# Patient Record
Sex: FEMALE | Race: WHITE | NOT HISPANIC OR LATINO | Employment: UNEMPLOYED | URBAN - METROPOLITAN AREA
[De-identification: names, ages, dates, MRNs, and addresses within clinical notes are randomized per-mention and may not be internally consistent; named-entity substitution may affect disease eponyms.]

---

## 2017-02-03 ENCOUNTER — HOSPITAL ENCOUNTER (OUTPATIENT)
Dept: CARDIOLOGY | Facility: HOSPITAL | Age: 62
Discharge: HOME | End: 2017-02-03
Attending: ORTHOPAEDIC SURGERY | Admitting: ORTHOPAEDIC SURGERY

## 2017-02-03 ENCOUNTER — TRANSCRIBE ORDERS (OUTPATIENT)
Dept: ADMINISTRATIVE | Facility: HOSPITAL | Age: 62
End: 2017-02-03

## 2017-02-03 ENCOUNTER — LAB (OUTPATIENT)
Dept: LAB | Facility: HOSPITAL | Age: 62
End: 2017-02-03
Attending: ORTHOPAEDIC SURGERY

## 2017-02-03 DIAGNOSIS — M20.20 HALLUX RIGIDUS, UNSPECIFIED LATERALITY: ICD-10-CM

## 2017-02-03 DIAGNOSIS — M20.20 HALLUX RIGIDUS, UNSPECIFIED LATERALITY: Primary | ICD-10-CM

## 2017-02-03 LAB
ALBUMIN SERPL-MCNC: 4.6 G/DL (ref 3.5–5.2)
ALBUMIN/GLOB SERPL: 1.7 G/DL
ALP SERPL-CCNC: 47 U/L (ref 39–117)
ALT SERPL W P-5'-P-CCNC: 26 U/L (ref 1–33)
ANION GAP SERPL CALCULATED.3IONS-SCNC: 12.7 MMOL/L
AST SERPL-CCNC: 25 U/L (ref 1–32)
BASOPHILS # BLD AUTO: 0.03 10*3/MM3 (ref 0–0.2)
BASOPHILS NFR BLD AUTO: 0.4 % (ref 0–1.5)
BILIRUB SERPL-MCNC: 0.6 MG/DL (ref 0.1–1.2)
BUN BLD-MCNC: 13 MG/DL (ref 6–20)
BUN/CREAT SERPL: 16 (ref 7–25)
CALCIUM SPEC-SCNC: 9.8 MG/DL (ref 8.6–10.5)
CHLORIDE SERPL-SCNC: 100 MMOL/L (ref 98–107)
CO2 SERPL-SCNC: 26.3 MMOL/L (ref 22–29)
CREAT BLD-MCNC: 0.81 MG/DL (ref 0.57–1)
DEPRECATED RDW RBC AUTO: 45.6 FL (ref 37–54)
EOSINOPHIL # BLD AUTO: 0.34 10*3/MM3 (ref 0–0.7)
EOSINOPHIL NFR BLD AUTO: 5 % (ref 0.3–6.2)
ERYTHROCYTE [DISTWIDTH] IN BLOOD BY AUTOMATED COUNT: 13.8 % (ref 11.7–13)
GFR SERPL CREATININE-BSD FRML MDRD: 72 ML/MIN/1.73
GLOBULIN UR ELPH-MCNC: 2.7 GM/DL
GLUCOSE BLD-MCNC: 81 MG/DL (ref 65–99)
HCT VFR BLD AUTO: 44.4 % (ref 35.6–45.5)
HGB BLD-MCNC: 14.8 G/DL (ref 11.9–15.5)
IMM GRANULOCYTES # BLD: 0.02 10*3/MM3 (ref 0–0.03)
IMM GRANULOCYTES NFR BLD: 0.3 % (ref 0–0.5)
LYMPHOCYTES # BLD AUTO: 2.85 10*3/MM3 (ref 0.9–4.8)
LYMPHOCYTES NFR BLD AUTO: 41.9 % (ref 19.6–45.3)
MCH RBC QN AUTO: 30.3 PG (ref 26.9–32)
MCHC RBC AUTO-ENTMCNC: 33.3 G/DL (ref 32.4–36.3)
MCV RBC AUTO: 90.8 FL (ref 80.5–98.2)
MONOCYTES # BLD AUTO: 0.43 10*3/MM3 (ref 0.2–1.2)
MONOCYTES NFR BLD AUTO: 6.3 % (ref 5–12)
NEUTROPHILS # BLD AUTO: 3.13 10*3/MM3 (ref 1.9–8.1)
NEUTROPHILS NFR BLD AUTO: 46.1 % (ref 42.7–76)
PLATELET # BLD AUTO: 256 10*3/MM3 (ref 140–500)
PMV BLD AUTO: 10.9 FL (ref 6–12)
POTASSIUM BLD-SCNC: 3.8 MMOL/L (ref 3.5–5.2)
PROT SERPL-MCNC: 7.3 G/DL (ref 6–8.5)
RBC # BLD AUTO: 4.89 10*6/MM3 (ref 3.9–5.2)
SODIUM BLD-SCNC: 139 MMOL/L (ref 136–145)
WBC NRBC COR # BLD: 6.8 10*3/MM3 (ref 4.5–10.7)

## 2017-02-03 PROCEDURE — 36415 COLL VENOUS BLD VENIPUNCTURE: CPT

## 2017-02-03 PROCEDURE — 85025 COMPLETE CBC W/AUTO DIFF WBC: CPT

## 2017-02-03 PROCEDURE — 80053 COMPREHEN METABOLIC PANEL: CPT

## 2017-02-03 PROCEDURE — 93005 ELECTROCARDIOGRAM TRACING: CPT | Performed by: ORTHOPAEDIC SURGERY

## 2017-02-03 PROCEDURE — 93010 ELECTROCARDIOGRAM REPORT: CPT | Performed by: INTERNAL MEDICINE

## 2017-07-28 ENCOUNTER — APPOINTMENT (OUTPATIENT)
Dept: WOMENS IMAGING | Facility: HOSPITAL | Age: 62
End: 2017-07-28

## 2017-07-28 PROCEDURE — 77067 SCR MAMMO BI INCL CAD: CPT | Performed by: RADIOLOGY

## 2017-07-28 PROCEDURE — G0202 SCR MAMMO BI INCL CAD: HCPCS | Performed by: RADIOLOGY

## 2017-07-28 PROCEDURE — MDREVIEWSP: Performed by: RADIOLOGY

## 2017-07-28 PROCEDURE — 77063 BREAST TOMOSYNTHESIS BI: CPT | Performed by: RADIOLOGY

## 2018-05-22 ENCOUNTER — LAB (OUTPATIENT)
Dept: LAB | Facility: HOSPITAL | Age: 63
End: 2018-05-22
Attending: OBSTETRICS & GYNECOLOGY

## 2018-05-22 ENCOUNTER — TRANSCRIBE ORDERS (OUTPATIENT)
Dept: ADMINISTRATIVE | Facility: HOSPITAL | Age: 63
End: 2018-05-22

## 2018-05-22 DIAGNOSIS — R10.31 ABDOMINAL PAIN, RIGHT LOWER QUADRANT: ICD-10-CM

## 2018-05-22 DIAGNOSIS — T81.44XA POSTOPERATIVE SEPTICEMIA (HCC): Primary | ICD-10-CM

## 2018-05-22 DIAGNOSIS — T81.44XA POSTOPERATIVE SEPTICEMIA (HCC): ICD-10-CM

## 2018-05-22 LAB
BASOPHILS # BLD AUTO: 0.04 10*3/MM3 (ref 0–0.2)
BASOPHILS NFR BLD AUTO: 0.5 % (ref 0–1.5)
DEPRECATED RDW RBC AUTO: 45.7 FL (ref 37–54)
EOSINOPHIL # BLD AUTO: 0.26 10*3/MM3 (ref 0–0.7)
EOSINOPHIL NFR BLD AUTO: 3.4 % (ref 0.3–6.2)
ERYTHROCYTE [DISTWIDTH] IN BLOOD BY AUTOMATED COUNT: 13.6 % (ref 11.7–13)
HCT VFR BLD AUTO: 43.9 % (ref 35.6–45.5)
HGB BLD-MCNC: 14.4 G/DL (ref 11.9–15.5)
IMM GRANULOCYTES # BLD: 0 10*3/MM3 (ref 0–0.03)
IMM GRANULOCYTES NFR BLD: 0 % (ref 0–0.5)
LYMPHOCYTES # BLD AUTO: 2.52 10*3/MM3 (ref 0.9–4.8)
LYMPHOCYTES NFR BLD AUTO: 33.3 % (ref 19.6–45.3)
MCH RBC QN AUTO: 30.3 PG (ref 26.9–32)
MCHC RBC AUTO-ENTMCNC: 32.8 G/DL (ref 32.4–36.3)
MCV RBC AUTO: 92.4 FL (ref 80.5–98.2)
MONOCYTES # BLD AUTO: 0.56 10*3/MM3 (ref 0.2–1.2)
MONOCYTES NFR BLD AUTO: 7.4 % (ref 5–12)
NEUTROPHILS # BLD AUTO: 4.19 10*3/MM3 (ref 1.9–8.1)
NEUTROPHILS NFR BLD AUTO: 55.4 % (ref 42.7–76)
PLATELET # BLD AUTO: 232 10*3/MM3 (ref 140–500)
PMV BLD AUTO: 10.3 FL (ref 6–12)
RBC # BLD AUTO: 4.75 10*6/MM3 (ref 3.9–5.2)
WBC NRBC COR # BLD: 7.57 10*3/MM3 (ref 4.5–10.7)

## 2018-05-22 PROCEDURE — 36415 COLL VENOUS BLD VENIPUNCTURE: CPT

## 2018-05-22 PROCEDURE — 85025 COMPLETE CBC W/AUTO DIFF WBC: CPT

## 2018-08-21 ENCOUNTER — APPOINTMENT (OUTPATIENT)
Dept: WOMENS IMAGING | Facility: HOSPITAL | Age: 63
End: 2018-08-21

## 2018-08-21 PROCEDURE — 77063 BREAST TOMOSYNTHESIS BI: CPT | Performed by: RADIOLOGY

## 2018-08-21 PROCEDURE — MDREVIEWSP: Performed by: RADIOLOGY

## 2018-08-21 PROCEDURE — 77067 SCR MAMMO BI INCL CAD: CPT | Performed by: RADIOLOGY

## 2019-09-23 ENCOUNTER — APPOINTMENT (OUTPATIENT)
Dept: WOMENS IMAGING | Facility: HOSPITAL | Age: 64
End: 2019-09-23

## 2019-09-23 PROCEDURE — 77063 BREAST TOMOSYNTHESIS BI: CPT | Performed by: RADIOLOGY

## 2019-09-23 PROCEDURE — 77067 SCR MAMMO BI INCL CAD: CPT | Performed by: RADIOLOGY

## 2019-09-23 PROCEDURE — MDREVIEWSP: Performed by: RADIOLOGY

## 2019-11-20 ENCOUNTER — OFFICE (OUTPATIENT)
Dept: URBAN - METROPOLITAN AREA CLINIC 66 | Facility: CLINIC | Age: 64
End: 2019-11-20
Payer: COMMERCIAL

## 2019-11-20 VITALS
HEART RATE: 70 BPM | DIASTOLIC BLOOD PRESSURE: 78 MMHG | HEIGHT: 64 IN | WEIGHT: 120 LBS | SYSTOLIC BLOOD PRESSURE: 142 MMHG

## 2019-11-20 DIAGNOSIS — K58.9 IRRITABLE BOWEL SYNDROME WITHOUT DIARRHEA: ICD-10-CM

## 2019-11-20 DIAGNOSIS — R68.81 EARLY SATIETY: ICD-10-CM

## 2019-11-20 DIAGNOSIS — R15.0 INCOMPLETE DEFECATION: ICD-10-CM

## 2019-11-20 DIAGNOSIS — K30 FUNCTIONAL DYSPEPSIA: ICD-10-CM

## 2019-11-20 PROCEDURE — 99242 OFF/OP CONSLTJ NEW/EST SF 20: CPT | Performed by: INTERNAL MEDICINE

## 2019-11-20 PROCEDURE — 99202 OFFICE O/P NEW SF 15 MIN: CPT | Performed by: INTERNAL MEDICINE

## 2019-11-20 RX ORDER — DICYCLOMINE HYDROCHLORIDE 20 MG/1
80 TABLET ORAL
Qty: 120 | Refills: 12 | Status: ACTIVE
Start: 2019-11-20

## 2019-11-20 RX ORDER — FAMOTIDINE 40 MG/1
80 TABLET, FILM COATED ORAL
Qty: 60 | Refills: 4 | Status: ACTIVE
Start: 2019-11-20

## 2019-12-02 ENCOUNTER — AMBULATORY SURGICAL CENTER (OUTPATIENT)
Dept: URBAN - METROPOLITAN AREA SURGERY 20 | Facility: SURGERY | Age: 64
End: 2019-12-02
Payer: COMMERCIAL

## 2019-12-02 ENCOUNTER — OFFICE (OUTPATIENT)
Dept: URBAN - METROPOLITAN AREA PATHOLOGY 4 | Facility: PATHOLOGY | Age: 64
End: 2019-12-02

## 2019-12-02 VITALS — HEIGHT: 64 IN

## 2019-12-02 DIAGNOSIS — R68.81 EARLY SATIETY: ICD-10-CM

## 2019-12-02 DIAGNOSIS — K29.50 UNSPECIFIED CHRONIC GASTRITIS WITHOUT BLEEDING: ICD-10-CM

## 2019-12-02 DIAGNOSIS — K30 FUNCTIONAL DYSPEPSIA: ICD-10-CM

## 2019-12-02 DIAGNOSIS — Z12.11 ENCOUNTER FOR SCREENING FOR MALIGNANT NEOPLASM OF COLON: ICD-10-CM

## 2019-12-02 PROBLEM — K29.70 GASTRITIS, UNSPECIFIED, WITHOUT BLEEDING: Status: ACTIVE | Noted: 2019-12-02

## 2019-12-02 LAB
GI HISTOLOGY: A. SELECT: (no result)
GI HISTOLOGY: B. SELECT: (no result)
GI HISTOLOGY: PDF REPORT: (no result)

## 2019-12-02 PROCEDURE — 88305 TISSUE EXAM BY PATHOLOGIST: CPT | Performed by: INTERNAL MEDICINE

## 2019-12-02 PROCEDURE — 43239 EGD BIOPSY SINGLE/MULTIPLE: CPT | Performed by: INTERNAL MEDICINE

## 2019-12-02 PROCEDURE — 45378 DIAGNOSTIC COLONOSCOPY: CPT | Mod: 33 | Performed by: INTERNAL MEDICINE

## 2021-03-16 ENCOUNTER — BULK ORDERING (OUTPATIENT)
Dept: CASE MANAGEMENT | Facility: OTHER | Age: 66
End: 2021-03-16

## 2021-03-16 DIAGNOSIS — Z23 IMMUNIZATION DUE: ICD-10-CM

## 2021-06-16 ENCOUNTER — IMMUNIZATION (OUTPATIENT)
Dept: VACCINE CLINIC | Facility: HOSPITAL | Age: 66
End: 2021-06-16

## 2021-06-16 PROCEDURE — 0001A: CPT | Performed by: INTERNAL MEDICINE

## 2021-06-16 PROCEDURE — 91300 HC SARSCOV02 VAC 30MCG/0.3ML IM: CPT | Performed by: INTERNAL MEDICINE

## 2021-06-17 ENCOUNTER — APPOINTMENT (OUTPATIENT)
Dept: VACCINE CLINIC | Facility: HOSPITAL | Age: 66
End: 2021-06-17

## 2021-07-09 ENCOUNTER — IMMUNIZATION (OUTPATIENT)
Dept: VACCINE CLINIC | Facility: HOSPITAL | Age: 66
End: 2021-07-09

## 2021-07-09 ENCOUNTER — APPOINTMENT (OUTPATIENT)
Dept: VACCINE CLINIC | Facility: HOSPITAL | Age: 66
End: 2021-07-09

## 2021-07-09 PROCEDURE — 0002A: CPT | Performed by: INTERNAL MEDICINE

## 2021-07-09 PROCEDURE — 91300 HC SARSCOV02 VAC 30MCG/0.3ML IM: CPT | Performed by: INTERNAL MEDICINE

## 2021-07-15 ENCOUNTER — APPOINTMENT (OUTPATIENT)
Dept: VACCINE CLINIC | Facility: HOSPITAL | Age: 66
End: 2021-07-15

## 2021-12-29 ENCOUNTER — APPOINTMENT (OUTPATIENT)
Dept: VACCINE CLINIC | Facility: HOSPITAL | Age: 66
End: 2021-12-29

## 2022-06-01 ENCOUNTER — TRANSCRIBE ORDERS (OUTPATIENT)
Dept: PHYSICAL THERAPY | Facility: CLINIC | Age: 67
End: 2022-06-01

## 2022-06-01 DIAGNOSIS — Z96.612 STATUS POST TOTAL SHOULDER ARTHROPLASTY, LEFT: Primary | ICD-10-CM

## 2022-06-02 ENCOUNTER — TREATMENT (OUTPATIENT)
Dept: PHYSICAL THERAPY | Facility: CLINIC | Age: 67
End: 2022-06-02

## 2022-06-02 DIAGNOSIS — R68.89 IMPAIRED FUNCTION OF UPPER EXTREMITY: ICD-10-CM

## 2022-06-02 DIAGNOSIS — M25.512 CHRONIC LEFT SHOULDER PAIN: ICD-10-CM

## 2022-06-02 DIAGNOSIS — G89.29 CHRONIC LEFT SHOULDER PAIN: ICD-10-CM

## 2022-06-02 DIAGNOSIS — M19.019 SHOULDER ARTHRITIS: Primary | ICD-10-CM

## 2022-06-02 PROCEDURE — 97140 MANUAL THERAPY 1/> REGIONS: CPT | Performed by: PHYSICAL THERAPIST

## 2022-06-02 PROCEDURE — 97162 PT EVAL MOD COMPLEX 30 MIN: CPT | Performed by: PHYSICAL THERAPIST

## 2022-06-02 PROCEDURE — 97530 THERAPEUTIC ACTIVITIES: CPT | Performed by: PHYSICAL THERAPIST

## 2022-06-02 PROCEDURE — 97110 THERAPEUTIC EXERCISES: CPT | Performed by: PHYSICAL THERAPIST
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
## 2022-06-07 ENCOUNTER — TREATMENT (OUTPATIENT)
Dept: PHYSICAL THERAPY | Facility: CLINIC | Age: 67
End: 2022-06-07

## 2022-06-07 DIAGNOSIS — M19.019 SHOULDER ARTHRITIS: Primary | ICD-10-CM

## 2022-06-07 DIAGNOSIS — G89.29 CHRONIC LEFT SHOULDER PAIN: ICD-10-CM

## 2022-06-07 DIAGNOSIS — M25.512 CHRONIC LEFT SHOULDER PAIN: ICD-10-CM

## 2022-06-07 DIAGNOSIS — R68.89 IMPAIRED FUNCTION OF UPPER EXTREMITY: ICD-10-CM

## 2022-06-07 PROCEDURE — 97140 MANUAL THERAPY 1/> REGIONS: CPT | Performed by: PHYSICAL THERAPIST

## 2022-06-07 PROCEDURE — 97110 THERAPEUTIC EXERCISES: CPT | Performed by: PHYSICAL THERAPIST

## 2022-06-07 NOTE — PROGRESS NOTES
Physical Therapy Daily Progress Note    Patient: Tati Coronado   : 1955  Referring practitioner: BASIL Anderson  Today's Date: 2022  Patient seen for 2 sessions    Visit Diagnoses:    ICD-10-CM ICD-9-CM   1. Shoulder arthritis  M19.019 716.91   2. Impaired function of upper extremity  R68.89 V49.5   3. Chronic left shoulder pain  M25.512 719.41    G89.29 338.29       Subjective   Tati Coronado reports: that she was compliant with her HEP.  Did have some pain with scapular retraction/ER      Objective   Near full flexion and abduction but moderately limited IR due to pain and tightness    See Exercise, Manual, and Modality Logs for complete treatment.     Patient Education: Required cueing for proper HEP technique    Assessment/Plan  With cueing exhibited proper exercise technique.  Considerable crepitus with abduction.  Able to tolerate new exercises without increased symptoms     Progress strengthening /stabilization /functional activity           Timed:  Manual Therapy:    10     mins  93491;  Therapeutic Exercise:    30/40     mins  09779;     Neuromuscular Alana:    0    mins  20492;    Therapeutic Activity:     0     mins  63780;     Ultrasound:      0     mins  46970;    Iontophoresis              __0_   mins  Dry Needling               _____   mins        Untimed:  Electrical Stimulation:     0    mins  06901 ( );  Mechanical Traction:             mins  20614;   Paraffin                       _____  mins     Timed Treatment:   40   mins   Total Treatment:     50   mins    Bhavna Davis PT  [place] License # [id number]  Physical Therapist    Electronically signed by Bhavna Davis PT, 22, 3:09 PM EDT

## 2022-06-08 ENCOUNTER — HOSPITAL ENCOUNTER (OUTPATIENT)
Dept: CARDIOLOGY | Facility: HOSPITAL | Age: 67
Discharge: HOME | End: 2022-06-08

## 2022-06-08 ENCOUNTER — HOSPITAL ENCOUNTER (OUTPATIENT)
Dept: GENERAL RADIOLOGY | Facility: HOSPITAL | Age: 67
Discharge: HOME | End: 2022-06-08

## 2022-06-08 ENCOUNTER — LAB (OUTPATIENT)
Dept: LAB | Facility: HOSPITAL | Age: 67
End: 2022-06-08

## 2022-06-08 ENCOUNTER — TRANSCRIBE ORDERS (OUTPATIENT)
Dept: ADMINISTRATIVE | Facility: HOSPITAL | Age: 67
End: 2022-06-08

## 2022-06-08 DIAGNOSIS — M25.512 LEFT SHOULDER PAIN, UNSPECIFIED CHRONICITY: ICD-10-CM

## 2022-06-08 DIAGNOSIS — Z01.818 PRE-OP TESTING: ICD-10-CM

## 2022-06-08 DIAGNOSIS — Z01.811 PRE-OP CHEST EXAM: ICD-10-CM

## 2022-06-08 DIAGNOSIS — Z01.818 PRE-OP TESTING: Primary | ICD-10-CM

## 2022-06-08 LAB
ALBUMIN SERPL-MCNC: 4.6 G/DL (ref 3.5–5.2)
ALBUMIN/GLOB SERPL: 2.4 G/DL
ALP SERPL-CCNC: 50 U/L (ref 39–117)
ALT SERPL W P-5'-P-CCNC: 16 U/L (ref 1–33)
ANION GAP SERPL CALCULATED.3IONS-SCNC: 10 MMOL/L (ref 5–15)
APTT PPP: 24.9 SECONDS (ref 22.7–35.4)
AST SERPL-CCNC: 20 U/L (ref 1–32)
BASOPHILS # BLD AUTO: 0.06 10*3/MM3 (ref 0–0.2)
BASOPHILS NFR BLD AUTO: 0.9 % (ref 0–1.5)
BILIRUB SERPL-MCNC: 0.5 MG/DL (ref 0–1.2)
BILIRUB UR QL STRIP: NEGATIVE
BUN SERPL-MCNC: 11 MG/DL (ref 8–23)
BUN/CREAT SERPL: 15.7 (ref 7–25)
CALCIUM SPEC-SCNC: 9.2 MG/DL (ref 8.6–10.5)
CHLORIDE SERPL-SCNC: 105 MMOL/L (ref 98–107)
CLARITY UR: CLEAR
CO2 SERPL-SCNC: 26 MMOL/L (ref 22–29)
COLOR UR: YELLOW
CREAT SERPL-MCNC: 0.7 MG/DL (ref 0.57–1)
DEPRECATED RDW RBC AUTO: 40.5 FL (ref 37–54)
EGFRCR SERPLBLD CKD-EPI 2021: 96.7 ML/MIN/1.73
EOSINOPHIL # BLD AUTO: 0.3 10*3/MM3 (ref 0–0.4)
EOSINOPHIL NFR BLD AUTO: 4.6 % (ref 0.3–6.2)
ERYTHROCYTE [DISTWIDTH] IN BLOOD BY AUTOMATED COUNT: 12.5 % (ref 12.3–15.4)
GLOBULIN UR ELPH-MCNC: 1.9 GM/DL
GLUCOSE SERPL-MCNC: 81 MG/DL (ref 65–99)
GLUCOSE UR STRIP-MCNC: NEGATIVE MG/DL
HCT VFR BLD AUTO: 42.7 % (ref 34–46.6)
HGB BLD-MCNC: 14 G/DL (ref 12–15.9)
HGB UR QL STRIP.AUTO: NEGATIVE
IMM GRANULOCYTES # BLD AUTO: 0.03 10*3/MM3 (ref 0–0.05)
IMM GRANULOCYTES NFR BLD AUTO: 0.5 % (ref 0–0.5)
INR PPP: 1.01 (ref 0.9–1.1)
KETONES UR QL STRIP: NEGATIVE
LEUKOCYTE ESTERASE UR QL STRIP.AUTO: NEGATIVE
LYMPHOCYTES # BLD AUTO: 2.17 10*3/MM3 (ref 0.7–3.1)
LYMPHOCYTES NFR BLD AUTO: 33.1 % (ref 19.6–45.3)
MCH RBC QN AUTO: 29.5 PG (ref 26.6–33)
MCHC RBC AUTO-ENTMCNC: 32.8 G/DL (ref 31.5–35.7)
MCV RBC AUTO: 89.9 FL (ref 79–97)
MONOCYTES # BLD AUTO: 0.48 10*3/MM3 (ref 0.1–0.9)
MONOCYTES NFR BLD AUTO: 7.3 % (ref 5–12)
NEUTROPHILS NFR BLD AUTO: 3.52 10*3/MM3 (ref 1.7–7)
NEUTROPHILS NFR BLD AUTO: 53.6 % (ref 42.7–76)
NITRITE UR QL STRIP: NEGATIVE
NRBC BLD AUTO-RTO: 0 /100 WBC (ref 0–0.2)
PH UR STRIP.AUTO: 6 [PH] (ref 5–8)
PLATELET # BLD AUTO: 274 10*3/MM3 (ref 140–450)
PMV BLD AUTO: 9.9 FL (ref 6–12)
POTASSIUM SERPL-SCNC: 3.9 MMOL/L (ref 3.5–5.2)
PROT SERPL-MCNC: 6.5 G/DL (ref 6–8.5)
PROT UR QL STRIP: NEGATIVE
PROTHROMBIN TIME: 13.2 SECONDS (ref 11.7–14.2)
QT INTERVAL: 363 MS
RBC # BLD AUTO: 4.75 10*6/MM3 (ref 3.77–5.28)
SODIUM SERPL-SCNC: 141 MMOL/L (ref 136–145)
SP GR UR STRIP: <=1.005 (ref 1–1.03)
UROBILINOGEN UR QL STRIP: NORMAL
WBC NRBC COR # BLD: 6.56 10*3/MM3 (ref 3.4–10.8)

## 2022-06-08 PROCEDURE — 81003 URINALYSIS AUTO W/O SCOPE: CPT

## 2022-06-08 PROCEDURE — 36415 COLL VENOUS BLD VENIPUNCTURE: CPT

## 2022-06-08 PROCEDURE — 85610 PROTHROMBIN TIME: CPT

## 2022-06-08 PROCEDURE — 85730 THROMBOPLASTIN TIME PARTIAL: CPT

## 2022-06-08 PROCEDURE — 71046 X-RAY EXAM CHEST 2 VIEWS: CPT

## 2022-06-08 PROCEDURE — 93010 ELECTROCARDIOGRAM REPORT: CPT | Performed by: INTERNAL MEDICINE

## 2022-06-08 PROCEDURE — 85025 COMPLETE CBC W/AUTO DIFF WBC: CPT

## 2022-06-08 PROCEDURE — 80053 COMPREHEN METABOLIC PANEL: CPT

## 2022-06-08 PROCEDURE — 93005 ELECTROCARDIOGRAM TRACING: CPT | Performed by: ORTHOPAEDIC SURGERY

## 2022-06-09 ENCOUNTER — TREATMENT (OUTPATIENT)
Dept: PHYSICAL THERAPY | Facility: CLINIC | Age: 67
End: 2022-06-09

## 2022-06-09 DIAGNOSIS — R68.89 IMPAIRED FUNCTION OF UPPER EXTREMITY: ICD-10-CM

## 2022-06-09 DIAGNOSIS — G89.29 CHRONIC LEFT SHOULDER PAIN: ICD-10-CM

## 2022-06-09 DIAGNOSIS — M19.019 SHOULDER ARTHRITIS: Primary | ICD-10-CM

## 2022-06-09 DIAGNOSIS — M25.512 CHRONIC LEFT SHOULDER PAIN: ICD-10-CM

## 2022-06-09 PROCEDURE — 97530 THERAPEUTIC ACTIVITIES: CPT | Performed by: PHYSICAL THERAPIST

## 2022-06-09 PROCEDURE — 97140 MANUAL THERAPY 1/> REGIONS: CPT | Performed by: PHYSICAL THERAPIST

## 2022-06-09 PROCEDURE — 97110 THERAPEUTIC EXERCISES: CPT | Performed by: PHYSICAL THERAPIST

## 2022-06-09 NOTE — PROGRESS NOTES
Physical Therapy Daily Progress Note    Patient: Tati Hanley   : 1955  Referring practitioner: BASIL Anderson  Today's Date: 2022  Patient seen for 3 sessions    Visit Diagnoses:    ICD-10-CM ICD-9-CM   1. Shoulder arthritis  M19.019 716.91   2. Impaired function of upper extremity  R68.89 V49.5   3. Chronic left shoulder pain  M25.512 719.41    G89.29 338.29       Subjective   Tati Hanley reports: that she has been compliant with her HEP.  Had quite a bit of pain last night preventing her sleep.  Unable to take meds last night due to upcoming surgery.      Objective   Palpable crepitus with shoulder flexion and abduction    See Exercise, Manual, and Modality Logs for complete treatment.     Patient Education: alternate some of the scapular exercises to do every other day. Instructed patient to call MD office and request ppt with PA/NP that works with Dr Hair so that she can ask the many questions that she has about her post op course    Assessment/Plan  Patient with considerable crepitus in shoulder preventing some exercises and chronic cervical pain which prevents some scapular exercises.  With modification of exercise routine shoulder minimize aggravation of chronic symptoms.  Patient with many question and was advised to speak to professional in MD office with hopes of answering her questions.   Required few cues for proper exercise technique today    Progress strengthening /stabilization /functional activity           Timed:  Manual Therapy:    8     mins  76722;  Therapeutic Exercise:    25/32     mins  90475;     Neuromuscular Alana:    0    mins  41429;    Therapeutic Activity:     10     mins  02441;   Discussion of post op course, call MD  Ultrasound:      0     mins  89975;    Iontophoresis              __0_   mins  Dry Needling               _____   mins        Untimed:  Electrical Stimulation:     0    mins  49096 ( );  Mechanical Traction:             mins   24878;   Paraffin                       _____  mins     Timed Treatment:   43   mins   Total Treatment:     60   mins    Bhavna Davis PT  [place] License # [id number]  Physical Therapist    Electronically signed by Bhavna Davis PT, 06/09/22, 12:10 PM EDT

## 2022-06-13 ENCOUNTER — TREATMENT (OUTPATIENT)
Dept: PHYSICAL THERAPY | Facility: CLINIC | Age: 67
End: 2022-06-13

## 2022-06-13 DIAGNOSIS — R68.89 IMPAIRED FUNCTION OF UPPER EXTREMITY: ICD-10-CM

## 2022-06-13 DIAGNOSIS — M25.512 CHRONIC LEFT SHOULDER PAIN: ICD-10-CM

## 2022-06-13 DIAGNOSIS — M19.019 SHOULDER ARTHRITIS: Primary | ICD-10-CM

## 2022-06-13 DIAGNOSIS — G89.29 CHRONIC LEFT SHOULDER PAIN: ICD-10-CM

## 2022-06-13 PROCEDURE — 97530 THERAPEUTIC ACTIVITIES: CPT | Performed by: PHYSICAL THERAPIST

## 2022-06-13 PROCEDURE — 97110 THERAPEUTIC EXERCISES: CPT | Performed by: PHYSICAL THERAPIST

## 2022-06-13 NOTE — PROGRESS NOTES
Physical Therapy Daily Progress Note    Patient: Tati Hanley   : 1955  Referring practitioner: BASIL Anderson  Today's Date: 2022  Patient seen for 4 sessions    Visit Diagnoses:    ICD-10-CM ICD-9-CM   1. Shoulder arthritis  M19.019 716.91   2. Impaired function of upper extremity  R68.89 V49.5   3. Chronic left shoulder pain  M25.512 719.41    G89.29 338.29       Subjective   Tati Hanley reports: that she has been compliant with her HEP and is using quite a bit of ice on the shoulder.  Was able to speak with Dr Hair's PA this morning.  Will be getting a hard copy of her post op protocol in the mail soon.      Objective   Near full shoulder motion except for IR which is moderately limited    Abbreviated exercises today due to AC being out in the clinic and it is too warm for significant exertion    See Exercise, Manual, and Modality Logs for complete treatment.     Patient Education: keep doing HEP until her surgery. Required cueing for proper exercise technique. Discussed post op progression    Assessment/Plan  After instruction her exercise technique was good.   Has functional motion at this time and seems to understand her exercise program. To have her TSA next week.  All goals met    DC to HEP - to have surgery next week           Timed:  Manual Therapy:    0     mins  20090;  Therapeutic Exercise:    25/35     mins  95826;     Neuromuscular Alana:    0    mins  95812;    Therapeutic Activity:     25     mins  35981;     Ultrasound:      0     mins  18053;    Iontophoresis              __0_   mins  Dry Needling               _____   mins        Untimed:  Electrical Stimulation:     0    mins  65083 ( );  Mechanical Traction:             mins  93323;   Paraffin                       _____  mins     Timed Treatment:   50   mins   Total Treatment:     65   mins    Bhavna Davis PT  [place] License # [id number]  Physical Therapist    Electronically signed by Bhavna Davis PT,  06/13/22, 2:31 PM EDT

## 2022-07-18 ENCOUNTER — TREATMENT (OUTPATIENT)
Dept: PHYSICAL THERAPY | Facility: CLINIC | Age: 67
End: 2022-07-18

## 2022-07-18 DIAGNOSIS — R68.89 IMPAIRED FUNCTION OF UPPER EXTREMITY: ICD-10-CM

## 2022-07-18 DIAGNOSIS — Z96.612 STATUS POST TOTAL SHOULDER ARTHROPLASTY, LEFT: Primary | ICD-10-CM

## 2022-07-18 DIAGNOSIS — M25.512 ACUTE PAIN OF LEFT SHOULDER: ICD-10-CM

## 2022-07-18 PROCEDURE — 97110 THERAPEUTIC EXERCISES: CPT | Performed by: PHYSICAL THERAPIST

## 2022-07-18 PROCEDURE — G0283 ELEC STIM OTHER THAN WOUND: HCPCS | Performed by: PHYSICAL THERAPIST

## 2022-07-18 PROCEDURE — 97140 MANUAL THERAPY 1/> REGIONS: CPT | Performed by: PHYSICAL THERAPIST

## 2022-07-18 PROCEDURE — 97162 PT EVAL MOD COMPLEX 30 MIN: CPT | Performed by: PHYSICAL THERAPIST

## 2022-07-18 NOTE — PROGRESS NOTES
Physical Therapy Initial Evaluation and Plan of Care    Patient: Tati Hanley   : 1955  Diagnosis/ICD-10 Code:  Status post total shoulder arthroplasty, left [Z96.612]  Referring practitioner: Vickey Hair MD  Today's Date: 2022    Subjective Evaluation    History of Present Illness  Date of surgery: 2022  Mechanism of injury: Chronic left shoulder pain  Had some prehab done pending surgery  Underwent left TSR - 22  Sling all day - but does tend to take the sling off at night as she does not sleep well with it in place (instructed patient that she should sleep in it per MD protocol)  Doing pendulum exercises, forearm exercises, ball squeezes, elbow flexion/extension 2-3x/day  Taking 1/2 oxycodone at night w/benadryl   Had allergic reaction to full dose meds so now uses benadryl 45 minutes pre oxycodone      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 Pain  Current pain ratin  At best pain ratin  At worst pain ratin  Location: Posterior and anterior left shoulder, radiates into the biceps, arthritis is kicked up in her hands, no NT, occasional catching in the shoulder with pendulums  Quality: dull ache, knife-like, sharp, tight and discomfort  Relieving factors: medications, ice, rest and support  Aggravating factors: repetitive movement, movement, lifting, sleeping and outstretched reach  Progression: improved    Hand dominance: right    Diagnostic Tests  X-ray: abnormal  MRI studies: abnormal    Treatments  Previous treatment: injection treatment and medication  Current treatment: medication and physical therapy  Patient Goals  Patient goal: Pain relief, be able to do yoga           Objective          Postural Observations  Seated posture: fair  Standing posture: fair    Additional Postural Observation Details  Slight protraction of shoulders    Observations     Additional Shoulder Observation Details  Wearing sling on left arm, has resolvoing  ecchymosis in upper arm    Palpation   Left   Hypertonic in the upper trapezius.   Tenderness of the anterior deltoid, biceps, middle deltoid, pectoralis major, pectoralis minor, posterior deltoid and triceps.     Tenderness     Left Shoulder   Tenderness in the AC joint and biceps tendon (proximal).     Cervical/Thoracic Screen   Cervical range of motion within normal limits    Neurological Testing     Sensation     Shoulder   Left Shoulder   Intact: light touch    Active Range of Motion     Additional Active Range of Motion Details  No active motion per protocol    Passive Range of Motion   Left Shoulder   Flexion: 90 degrees with pain  Extension: 0 degrees   Abduction: 70 degrees with pain    Scapular Mobility   Left Shoulder   Scapular mobility: fair    Strength/Myotome Testing     Additional Strength Details  Not tested due to recent post op status          Assessment & Plan     Assessment  Impairments: abnormal muscle firing, abnormal muscle tone, abnormal or restricted ROM, activity intolerance, impaired physical strength, lacks appropriate home exercise program and pain with function  Functional Limitations: carrying objects, lifting, uncomfortable because of pain, moving in bed, reaching overhead and unable to perform repetitive tasks  Assessment details: 66 y.o. female seen 4 weeks post Left TSR presents with: 1. Left shoulder pain, 2. Decreased shoulder motion, 3. Tenderness to palpation anterior and posterior shoulder, 4. Slight restriction of scar mobility, 5. Sleep interruption due to pain, 6. Decreased tolerance for many normal ADL's, 7. Quick Dash Score of 72.5 disability  Prognosis: good    Goals  Plan Goals: Short Term Goals: 6 weeks  Patient will be able to tolerate initial exercises  Patient will have pain <5/10  Patient will be able to sleep without pain interruption   Patient will be able to dress self with 50% less difficulty     Long Term Goals: 12 weeks  Patient will be independent in  performing home exercise program.  Patient will have functional pain free shoulder AROM  Patient will be able to place and retrieve light weight items from the upper kitchen cabinets  Patient will be able to do a light yoga class without increased symptoms      Plan  Therapy options: will be seen for skilled therapy services  Planned modality interventions: electrical stimulation/Russian stimulation, cryotherapy and TENS  Planned therapy interventions: manual therapy, strengthening, stretching, therapeutic activities, home exercise program and joint mobilization  Frequency: 2x week  Duration in visits: 20  Duration in weeks: 12  Treatment plan discussed with: patient  Plan details: Access Code: [email or web address]  URL: [email or web address]  Date: 07/18/2022  Prepared by: Bhavna Davis    Exercises  Circular Shoulder Pendulum with Table Support - 3 x daily - 7 x weekly - 2 sets - 10 reps  Supine Elbow Flexion Extension AROM - 2 x daily - 7 x weekly - 2 sets - 10 reps  Supine Elbow Flexion Extension AROM - 2 x daily - 7 x weekly - 2 sets - 10 reps  Seated Bilateral Shoulder Flexion Towel Slide at Table Top - 2 x daily - 7 x weekly - 1 sets - 10 reps  Standing 'L' Stretch at Counter - 2 x daily - 7 x weekly - 1 sets - 10 reps  Shoulder External Rotation and Scapular Retraction - 2 x daily - 7 x weekly - 3 sets - 10 reps  Seated Shoulder Shrugs - 3 x daily - 7 x weekly - 3 sets - 10 reps          Manual Therapy:    15     mins  77951;  Therapeutic Exercise:    20     mins  90939;     Neuromuscular Alana:    0    mins  24804;    Therapeutic Activity:     0     mins  68756;     Ultrasound                  __0_  mins  55854  Iontophoresis                 0    mins  53237    Electrical Stimulation     15    mins  51964 ([id number])  Traction                         _0  mins  92485     Evaluation Time:     20  mins  Timed Treatment:   35   mins   Total Treatment:     75   mins    PT: Bhavna Davis, PT     License Number: [place] [id number]  [id number]  Electronically signed by Bhavna Davis PT, 07/18/22, 2:09 PM EDT    Certification Period: 7/18/2022 thru 10/15/2022  I certify that the therapy services are furnished while this patient is under my care.  The services outlined above are required by this patient, and will be reviewed every 90 days.         Physician Signature:__________________________________________________    PHYSICIAN: Vickey Hair MD      DATE:     Please sign and return via fax to [email or web address] . Thank you, [facility].    PT SIGNATURE: Bhavna Davis PT   [place] LICENSE:  [id number]

## 2022-07-21 ENCOUNTER — TREATMENT (OUTPATIENT)
Dept: PHYSICAL THERAPY | Facility: CLINIC | Age: 67
End: 2022-07-21

## 2022-07-21 DIAGNOSIS — Z96.612 STATUS POST TOTAL SHOULDER ARTHROPLASTY, LEFT: Primary | ICD-10-CM

## 2022-07-21 DIAGNOSIS — M25.512 ACUTE PAIN OF LEFT SHOULDER: ICD-10-CM

## 2022-07-21 DIAGNOSIS — R68.89 IMPAIRED FUNCTION OF UPPER EXTREMITY: ICD-10-CM

## 2022-07-21 PROCEDURE — 97140 MANUAL THERAPY 1/> REGIONS: CPT | Performed by: PHYSICAL THERAPIST

## 2022-07-21 PROCEDURE — 97110 THERAPEUTIC EXERCISES: CPT | Performed by: PHYSICAL THERAPIST

## 2022-07-21 PROCEDURE — G0283 ELEC STIM OTHER THAN WOUND: HCPCS | Performed by: PHYSICAL THERAPIST

## 2022-07-21 NOTE — PROGRESS NOTES
Physical Therapy Daily Treatment Note    Patient: Tati Hanley   : 1955  Referring practitioner: Vickey Hair MD  Today's Date: 2022  Patient seen for 2 sessions    Visit Diagnoses:    ICD-10-CM ICD-9-CM   1. Status post total shoulder arthroplasty, left  Z96.612 V43.61   2. Acute pain of left shoulder  M25.512 719.41   3. Impaired function of upper extremity  R68.89 V49.5       Subjective   Tati Hanley reports: that she is having more pain now but states that she has been more active all of the rest of the day.        Objective   Passive flexion 95*    patient with very difficulty time letting arm be passive    See Exercise, Manual, and Modality Logs for complete treatment.     Patient Education: need for relaxation of the arm    Assessment/Plan  Tati exhibits considerable guarding of hte arm with attempts of passive movements despite efforts of trying to relax.  4 weeks post TSA.     Progress per Plan of Care  Per protocol         Timed:  Manual Therapy:    13     mins  07268;  Therapeutic Exercise:    25/40     mins  45862;     Neuromuscular Alana:    0    mins  15358;    Therapeutic Activity:     0     mins  59785;     Ultrasound:      0     mins  10623;    Iontophoresis              __0_   mins  Dry Needling               _____   mins        Untimed:  Electrical Stimulation:     15    mins  87577 ( );  Mechanical Traction:             mins  26018;   Paraffin                       _____  mins     Timed Treatment:   38   mins   Total Treatment:     70   mins    Bhavna Davis PT  [place] License # [id number]  Physical Therapist    Electronically signed by Bhavna Davis PT, 22, 2:33 PM EDT

## 2022-07-25 ENCOUNTER — TREATMENT (OUTPATIENT)
Dept: PHYSICAL THERAPY | Facility: CLINIC | Age: 67
End: 2022-07-25

## 2022-07-25 DIAGNOSIS — M25.512 ACUTE PAIN OF LEFT SHOULDER: ICD-10-CM

## 2022-07-25 DIAGNOSIS — Z96.612 STATUS POST TOTAL SHOULDER ARTHROPLASTY, LEFT: Primary | ICD-10-CM

## 2022-07-25 DIAGNOSIS — R68.89 IMPAIRED FUNCTION OF UPPER EXTREMITY: ICD-10-CM

## 2022-07-25 PROCEDURE — 97110 THERAPEUTIC EXERCISES: CPT | Performed by: PHYSICAL THERAPIST

## 2022-07-25 PROCEDURE — 97140 MANUAL THERAPY 1/> REGIONS: CPT | Performed by: PHYSICAL THERAPIST

## 2022-07-25 PROCEDURE — G0283 ELEC STIM OTHER THAN WOUND: HCPCS | Performed by: PHYSICAL THERAPIST

## 2022-07-25 NOTE — PROGRESS NOTES
Physical Therapy Daily Treatment Note    Patient: Tati Hanley   : 1955  Referring practitioner: Vickey Hair MD  Today's Date: 2022  Patient seen for 3 sessions    Visit Diagnoses:    ICD-10-CM ICD-9-CM   1. Status post total shoulder arthroplasty, left  Z96.612 V43.61   2. Acute pain of left shoulder  M25.512 719.41   3. Impaired function of upper extremity  R68.89 V49.5       Subjective   Tati Hanley reports: that she is having some superior shoulder pain.  States that she is having trouble sleeping.  States that she dropped a bottle today and had to grab it with both hands and some pain with the attempt.  Does state that her motion is some better now.      Objective   Passive flexion 100*, abduction 85*    See Exercise, Manual, and Modality Logs for complete treatment.     Patient Education: no active motion with left shoulder    Assessment/Plan  Patient with excessive guarding of the shoulder preventing much passive motion.  Has difficulty relaxing the arm.  Compliant with therapy and HEP however.     Progress strengthening /stabilization /functional activity           Timed:  Manual Therapy:    15     mins  71503;  Therapeutic Exercise:    25/35     mins  60654;     Neuromuscular Alana:    0    mins  58828;    Therapeutic Activity:     0     mins  21236;     Ultrasound:      0     mins  27698;    Iontophoresis              __0_   mins  Dry Needling               _____   mins        Untimed:  Electrical Stimulation:     15    mins  66897 ( );  Mechanical Traction:             mins  30231;   Paraffin                       _____  mins     Timed Treatment:   40   mins   Total Treatment:     75   mins    Bhavna Davis PT  [place] License # [id number]  Physical Therapist    Electronically signed by Bhavna Davis PT, 22, 3:40 PM EDT

## 2022-08-01 ENCOUNTER — TREATMENT (OUTPATIENT)
Dept: PHYSICAL THERAPY | Facility: CLINIC | Age: 67
End: 2022-08-01

## 2022-08-01 DIAGNOSIS — Z96.612 STATUS POST TOTAL SHOULDER ARTHROPLASTY, LEFT: Primary | ICD-10-CM

## 2022-08-01 DIAGNOSIS — R68.89 IMPAIRED FUNCTION OF UPPER EXTREMITY: ICD-10-CM

## 2022-08-01 PROCEDURE — 97140 MANUAL THERAPY 1/> REGIONS: CPT | Performed by: PHYSICAL THERAPIST

## 2022-08-01 PROCEDURE — G0283 ELEC STIM OTHER THAN WOUND: HCPCS | Performed by: PHYSICAL THERAPIST

## 2022-08-01 PROCEDURE — 97110 THERAPEUTIC EXERCISES: CPT | Performed by: PHYSICAL THERAPIST

## 2022-08-04 ENCOUNTER — TREATMENT (OUTPATIENT)
Dept: PHYSICAL THERAPY | Facility: CLINIC | Age: 67
End: 2022-08-04

## 2022-08-04 DIAGNOSIS — M25.512 ACUTE PAIN OF LEFT SHOULDER: ICD-10-CM

## 2022-08-04 DIAGNOSIS — R68.89 IMPAIRED FUNCTION OF UPPER EXTREMITY: ICD-10-CM

## 2022-08-04 DIAGNOSIS — Z96.612 STATUS POST TOTAL SHOULDER ARTHROPLASTY, LEFT: Primary | ICD-10-CM

## 2022-08-04 PROCEDURE — 97110 THERAPEUTIC EXERCISES: CPT | Performed by: PHYSICAL THERAPIST

## 2022-08-04 PROCEDURE — G0283 ELEC STIM OTHER THAN WOUND: HCPCS | Performed by: PHYSICAL THERAPIST

## 2022-08-04 PROCEDURE — 97140 MANUAL THERAPY 1/> REGIONS: CPT | Performed by: PHYSICAL THERAPIST

## 2022-08-04 NOTE — PROGRESS NOTES
Physical Therapy Daily Treatment Note    Patient: Tati Hanley   : 1955  Referring practitioner: Vickey Hair MD  Today's Date: 2022  Patient seen for 5 sessions    Visit Diagnoses:    ICD-10-CM ICD-9-CM   1. Status post total shoulder arthroplasty, left  Z96.612 V43.61   2. Impaired function of upper extremity  R68.89 V49.5   3. Acute pain of left shoulder  M25.512 719.41       Subjective   Tati Hanley reports: that she has had a rough couple of weeks as she is doing too much using her computer at work.  Says that she has been doing grocery shopping, cleaning out her freezer.        Objective   Passive flexion 110*, abduction 95* in scapution  See Exercise, Manual, and Modality Logs for complete treatment.     Patient Education: encouraged patient to follow her restrictions - do not lifting    Assessment/Plan  Shoulder is very stiff as patient is so guarded with motion.  May be doing too much at home causing unnecessary soreness and guarding.  Felt good support with tape in place so hopefully will be able to relax arm a bit more.    Progress strengthening /stabilization /functional activity           Timed:  Manual Therapy:    18     mins  89036;  Therapeutic Exercise:    25/40     mins  52174;     Neuromuscular Alana:    0    mins  17834;    Therapeutic Activity:     0     mins  53266;     Ultrasound:      0     mins  59207;    Iontophoresis              __0_   mins  Dry Needling               _____   mins        Untimed:  Electrical Stimulation:     15    mins  47307 ( );  Mechanical Traction:             mins  27048;   Paraffin                       _____  mins     Timed Treatment:   43   mins   Total Treatment:     75   mins    Bhavna Davis PT  [place] License # [id number]  Physical Therapist    Electronically signed by Bhavna Davis PT, 22, 3:32 PM EDT

## 2022-08-08 ENCOUNTER — TREATMENT (OUTPATIENT)
Dept: PHYSICAL THERAPY | Facility: CLINIC | Age: 67
End: 2022-08-08

## 2022-08-08 DIAGNOSIS — Z96.612 STATUS POST TOTAL SHOULDER ARTHROPLASTY, LEFT: Primary | ICD-10-CM

## 2022-08-08 DIAGNOSIS — R68.89 IMPAIRED FUNCTION OF UPPER EXTREMITY: ICD-10-CM

## 2022-08-08 DIAGNOSIS — M25.512 ACUTE PAIN OF LEFT SHOULDER: ICD-10-CM

## 2022-08-08 PROCEDURE — G0283 ELEC STIM OTHER THAN WOUND: HCPCS | Performed by: PHYSICAL THERAPIST

## 2022-08-08 PROCEDURE — 97110 THERAPEUTIC EXERCISES: CPT | Performed by: PHYSICAL THERAPIST

## 2022-08-08 PROCEDURE — 97140 MANUAL THERAPY 1/> REGIONS: CPT | Performed by: PHYSICAL THERAPIST

## 2022-08-08 NOTE — PROGRESS NOTES
Physical Therapy Daily Treatment Note    Patient: Tati Hanley   : 1955  Referring practitioner: Vickey Hair MD  Today's Date: 2022  Patient seen for 6 sessions    Visit Diagnoses:    ICD-10-CM ICD-9-CM   1. Status post total shoulder arthroplasty, left  Z96.612 V43.61   2. Impaired function of upper extremity  R68.89 V49.5   3. Acute pain of left shoulder  M25.512 719.41       Subjective   Tati Hanley reports: that she had some sharp pain in the shoulder while walking on Saturday.  Then developed some vertigo that day. Notes that vertigo seems to be present when she looks down and and up.  Saw her PMD today had some blood work done to check some issues.  States that she hopes that she did aggravate the shoulder lifting bags of canned goods last week.        Objective   Very guarded shoulder positioning, increased tone in the upper trap and in biceps    See Exercise, Manual, and Modality Logs for complete treatment.     Patient Education: relax shoulder, no lifting greater than coffee cup    Assessment/Plan  6 weeks post TSR.  Very stiff shoulder with guarding.  Has had increased pain since over dong it at home and while at grocery.  No single incident but probably has some inflammation from increased activity    Progress strengthening /stabilization /functional activity           Timed:  Manual Therapy:    15     mins  35846;  Therapeutic Exercise:    23/35     mins  06978;     Neuromuscular Alana:    0    mins  32952;    Therapeutic Activity:     0     mins  38147;     Ultrasound:      0     mins  13053;    Iontophoresis              __0_   mins  Dry Needling               _____   mins        Untimed:  Electrical Stimulation:     15    mins  95793 ( );  Mechanical Traction:             mins  12241;   Paraffin                       _____  mins     Timed Treatment:   38   mins   Total Treatment:     75   mins    Bhavna Davis, PT  [place] License # [id number]  Physical  Therapist    Electronically signed by Bhavna Davis, PT, 08/08/22, 2:39 PM EDT

## 2022-08-11 ENCOUNTER — TREATMENT (OUTPATIENT)
Dept: PHYSICAL THERAPY | Facility: CLINIC | Age: 67
End: 2022-08-11

## 2022-08-11 DIAGNOSIS — M25.512 ACUTE PAIN OF LEFT SHOULDER: ICD-10-CM

## 2022-08-11 DIAGNOSIS — M19.019 SHOULDER ARTHRITIS: ICD-10-CM

## 2022-08-11 DIAGNOSIS — R68.89 IMPAIRED FUNCTION OF UPPER EXTREMITY: ICD-10-CM

## 2022-08-11 DIAGNOSIS — Z96.612 STATUS POST TOTAL SHOULDER ARTHROPLASTY, LEFT: Primary | ICD-10-CM

## 2022-08-11 PROCEDURE — G0283 ELEC STIM OTHER THAN WOUND: HCPCS | Performed by: PHYSICAL THERAPIST

## 2022-08-11 PROCEDURE — 97530 THERAPEUTIC ACTIVITIES: CPT | Performed by: PHYSICAL THERAPIST

## 2022-08-11 PROCEDURE — 97110 THERAPEUTIC EXERCISES: CPT | Performed by: PHYSICAL THERAPIST

## 2022-08-11 PROCEDURE — 97140 MANUAL THERAPY 1/> REGIONS: CPT | Performed by: PHYSICAL THERAPIST

## 2022-08-11 NOTE — PROGRESS NOTES
MD Letter - Reassessment  Patient: Tati Hanley   : 1955  Vickey Hair MD  Date of Initial Visit: Type: THERAPY  Noted: 2022  Today's Date: 2022  Patient seen for 7 sessions    Treatment has included: therapeutic exercise, neuromuscular re-education, manual therapy, electrical stimulation and cryotherapy    Subjective   Tati states that her shoulder is feeling better than it was last month.  She states that her motion has increased in most planes. She states that her pain is still constant and varies from 4-7/10.  She reports that the pain increases as the day goes on.she is taking Tylenol and Advil for pain control but some nights takes 1/2 of an oxycodone pill to allow sleep. She reports dorsal wrist and hand burning.    Objective   Shoulder AROM/PROM - Flexion 71/120*,  Abduction 43/118*,  ER 17/20*,  IR 54/60*  Strength - not tested due to recent post op status  Sensation - intact  Palpation - tenderness along the anterior aspect of the joint and into the lateral upper arm  Activity tolerances - she is still unable to sleep without pain interruption but only awakens once per night now.       Assessment/Plan  Patient has demonstrated moderate improvement since the initiation of therapy.  The pain has decreased slightly.  The motion has increased but is still more limited than desired at this point post op.  The activity tolerances have increased slightly.  I feel that the patient would benefit from continuing therapy.  If you have any questions concerning the care, please do not hesitate to contact me.          PT Signature: Bhavna Davis, PT  PT License #[id number]    Electronically signed by Bhavna Davis PT, 22, 2:35 PM EDT    Manual Therapy:    14     mins  26220;  Therapeutic Exercise:    1430     mins  27720;     Neuromuscular Alana:    0    mins  02424;    Therapeutic Activity:     10     mins  96366;    Ultrasound                     0 _  mins 05683  Electrical stim                15__    Timed Treatment:   38   mins   Total Treatment:     75   mins

## 2022-08-15 ENCOUNTER — TREATMENT (OUTPATIENT)
Dept: PHYSICAL THERAPY | Facility: CLINIC | Age: 67
End: 2022-08-15

## 2022-08-15 DIAGNOSIS — R68.89 IMPAIRED FUNCTION OF UPPER EXTREMITY: ICD-10-CM

## 2022-08-15 DIAGNOSIS — M25.512 ACUTE PAIN OF LEFT SHOULDER: ICD-10-CM

## 2022-08-15 DIAGNOSIS — Z96.612 STATUS POST TOTAL SHOULDER ARTHROPLASTY, LEFT: Primary | ICD-10-CM

## 2022-08-15 PROCEDURE — G0283 ELEC STIM OTHER THAN WOUND: HCPCS | Performed by: PHYSICAL THERAPIST

## 2022-08-15 PROCEDURE — 97140 MANUAL THERAPY 1/> REGIONS: CPT | Performed by: PHYSICAL THERAPIST

## 2022-08-15 PROCEDURE — 97110 THERAPEUTIC EXERCISES: CPT | Performed by: PHYSICAL THERAPIST

## 2022-08-15 NOTE — PROGRESS NOTES
Physical Therapy Daily Treatment Note    Patient: Tati Hanley   : 1955  Referring practitioner: Vickey Hair MD  Today's Date: 8/15/2022  Patient seen for 8 sessions    Visit Diagnoses:    ICD-10-CM ICD-9-CM   1. Status post total shoulder arthroplasty, left  Z96.612 V43.61   2. Impaired function of upper extremity  R68.89 V49.5   3. Acute pain of left shoulder  M25.512 719.41       Subjective   Tati Hanley reports: that she saw Dr Hair and he told her that she was just where she was supposed to be.  States that he eyeballed her motion and was pleased with her motion.  States that he took her out of the sling.  Has fewer episodes of tingling in the forearm.       Objective   Passive flexion 100*     See Exercise, Manual, and Modality Logs for complete treatment.     Patient Education: need for progressive motion, OK to start using the arm a bit more    Assessment/Plan  Still with very guarded motion in all planes.  MD pleased with progress.  Patient with pain greater than expected with exercise.      Progress strengthening /stabilization /functional activity           Timed:  Manual Therapy:    18     mins  31232;  Therapeutic Exercise:    23/40     mins  77023;     Neuromuscular Alana:    0    mins  08221;    Therapeutic Activity:     0     mins  53444;     Ultrasound:      0     mins  35056;    Iontophoresis              __0_   mins  Dry Needling               _____   mins        Untimed:  Electrical Stimulation:     15    mins  48617 ( );  Mechanical Traction:             mins  32248;   Paraffin                       _____  mins     Timed Treatment:   41   mins   Total Treatment:     80   mins    Bhavna Davis PT  [place] License # [id number]  Physical Therapist    Electronically signed by Bhavna Davis PT, 08/15/22, 2:36 PM EDT

## 2022-08-18 ENCOUNTER — TREATMENT (OUTPATIENT)
Dept: PHYSICAL THERAPY | Facility: CLINIC | Age: 67
End: 2022-08-18

## 2022-08-18 DIAGNOSIS — R68.89 IMPAIRED FUNCTION OF UPPER EXTREMITY: ICD-10-CM

## 2022-08-18 DIAGNOSIS — Z96.612 STATUS POST TOTAL SHOULDER ARTHROPLASTY, LEFT: Primary | ICD-10-CM

## 2022-08-18 DIAGNOSIS — M25.512 ACUTE PAIN OF LEFT SHOULDER: ICD-10-CM

## 2022-08-18 PROCEDURE — G0283 ELEC STIM OTHER THAN WOUND: HCPCS | Performed by: PHYSICAL THERAPIST

## 2022-08-18 PROCEDURE — 97110 THERAPEUTIC EXERCISES: CPT | Performed by: PHYSICAL THERAPIST

## 2022-08-18 PROCEDURE — 97140 MANUAL THERAPY 1/> REGIONS: CPT | Performed by: PHYSICAL THERAPIST

## 2022-08-18 NOTE — PROGRESS NOTES
Physical Therapy Daily Treatment Note    Patient: Tati Hanley   : 1955  Referring practitioner: Vickey Hair MD  Today's Date: 2022  Patient seen for 9 sessions    Visit Diagnoses:    ICD-10-CM ICD-9-CM   1. Status post total shoulder arthroplasty, left  Z96.612 V43.61   2. Impaired function of upper extremity  R68.89 V49.5   3. Acute pain of left shoulder  M25.512 719.41       Subjective   Tati Hanley reports: that she is still very guarded with movements.  States that she has a bit more pain today but thinks that it may be due to the fact that she has been much busier and has been cleaning house.  Lifted her vacuum  with the left hand while cleaning out her closet.      Objective   Tenderness in left pectorals    Very guarded shoulder position  See Exercise, Manual, and Modality Logs for complete treatment.     Patient Education: Reiterated restrictions.  No lifting greater than 5# with left hand     Assessment/Plan  8 weeks post Right TSR.  Very guarded shoulder positioning with decreased motion compared to expected ranges.  Patient is using the arm for moderately heavy activities at home without having proper firing of the shoulder mm for stabilization.      Progress strengthening /stabilization /functional activity           Timed:  Manual Therapy:    14     mins  04999;  Therapeutic Exercise:    25/40     mins  76294;     Neuromuscular Alana:    0    mins  60338;    Therapeutic Activity:     0     mins  42654;     Ultrasound:      0     mins  78035;    Iontophoresis              __0_   mins  Dry Needling               _____   mins        Untimed:  Electrical Stimulation:     15    mins  93662 ( );  Mechanical Traction:             mins  07551;   Paraffin                       _____  mins     Timed Treatment:   39   mins   Total Treatment:     75   mins    Bhavna Davis PT  [place] License # [id number]  Physical Therapist    Electronically signed by Bhavna Davis PT,  08/18/22, 3:14 PM EDT

## 2022-08-22 ENCOUNTER — TREATMENT (OUTPATIENT)
Dept: PHYSICAL THERAPY | Facility: CLINIC | Age: 67
End: 2022-08-22

## 2022-08-22 DIAGNOSIS — Z96.612 STATUS POST TOTAL SHOULDER ARTHROPLASTY, LEFT: Primary | ICD-10-CM

## 2022-08-22 DIAGNOSIS — M25.512 ACUTE PAIN OF LEFT SHOULDER: ICD-10-CM

## 2022-08-22 DIAGNOSIS — R68.89 IMPAIRED FUNCTION OF UPPER EXTREMITY: ICD-10-CM

## 2022-08-22 PROCEDURE — G0283 ELEC STIM OTHER THAN WOUND: HCPCS | Performed by: PHYSICAL THERAPIST

## 2022-08-22 PROCEDURE — 97140 MANUAL THERAPY 1/> REGIONS: CPT | Performed by: PHYSICAL THERAPIST

## 2022-08-22 PROCEDURE — 97110 THERAPEUTIC EXERCISES: CPT | Performed by: PHYSICAL THERAPIST

## 2022-08-22 NOTE — PROGRESS NOTES
Physical Therapy Daily Treatment Note    Patient: Tati Hanley   : 1955  Referring practitioner: Vickey Hair MD  Today's Date: 2022  Patient seen for 10 sessions    Visit Diagnoses:    ICD-10-CM ICD-9-CM   1. Status post total shoulder arthroplasty, left  Z96.612 V43.61   2. Impaired function of upper extremity  R68.89 V49.5   3. Acute pain of left shoulder  M25.512 719.41       Subjective   Tati Hanley reports: that her shoulder was really bothering her all weekend.  Has some spasms in the upper trap and interscapular region.  Has some flexor forearm ache but most of the pain is in the superior and posterior shoulder.  Has had to take pain meds to sleep and is getting up in the middle of the night for ice and meds.  Reports her pain to be 4-7/10 now.      Objective   Passive flexion 130*, ER 20*    See Exercise, Manual, and Modality Logs for complete treatment.     Patient Education: Take some OTC anti inflammatories with food as she has flared her shoulder up    Assessment/Plan  Tati hayes to have pinching type pain in the superior shoulder with attempts of elevation.  She has soreness causing excessive guarding.  Motion is slightly improved today from last session.  8 weeks post TSA    Progress strengthening /stabilization /functional activity           Timed:  Manual Therapy:    17     mins  09127;  Therapeutic Exercise:    25/40     mins  18408;     Neuromuscular Alana:    0    mins  38037;    Therapeutic Activity:     0     mins  80730;     Ultrasound:      0     mins  34838;    Iontophoresis              __0_   mins  Dry Needling               _____   mins        Untimed:  Electrical Stimulation:     15    mins  32229 ( );  Mechanical Traction:             mins  00719;   Paraffin                       _____  mins     Timed Treatment:   42   mins   Total Treatment:     80   mins    Bhavna Davis, PT  [place] License # [id number]  Physical Therapist    Electronically signed  by Bhavna Davis, PT, 08/22/22, 2:05 PM EDT

## 2022-08-25 ENCOUNTER — TREATMENT (OUTPATIENT)
Dept: PHYSICAL THERAPY | Facility: CLINIC | Age: 67
End: 2022-08-25

## 2022-08-25 DIAGNOSIS — M25.512 ACUTE PAIN OF LEFT SHOULDER: ICD-10-CM

## 2022-08-25 DIAGNOSIS — Z96.612 STATUS POST TOTAL SHOULDER ARTHROPLASTY, LEFT: Primary | ICD-10-CM

## 2022-08-25 DIAGNOSIS — R68.89 IMPAIRED FUNCTION OF UPPER EXTREMITY: ICD-10-CM

## 2022-08-25 PROCEDURE — 97140 MANUAL THERAPY 1/> REGIONS: CPT | Performed by: PHYSICAL THERAPIST

## 2022-08-25 PROCEDURE — 97110 THERAPEUTIC EXERCISES: CPT | Performed by: PHYSICAL THERAPIST

## 2022-08-25 PROCEDURE — G0283 ELEC STIM OTHER THAN WOUND: HCPCS | Performed by: PHYSICAL THERAPIST

## 2022-08-25 NOTE — PROGRESS NOTES
Physical Therapy Daily Treatment Note    Patient: Tati Hanley   : 1955  Referring practitioner: Vickey Hair MD  Today's Date: 2022  Patient seen for 11 sessions    Visit Diagnoses:    ICD-10-CM ICD-9-CM   1. Status post total shoulder arthroplasty, left  Z96.612 V43.61   2. Impaired function of upper extremity  R68.89 V49.5   3. Acute pain of left shoulder  M25.512 719.41       Subjective   Tati Hanley reports: that she was quite sore yesterday as she was doing more at home but felt like she was able to do better with her home exercises.        Objective   Passive flexion 120*,     See Exercise, Manual, and Modality Logs for complete treatment.     Patient Education:    Assessment/Plan  9 weeks post Left TSA.  Motion still limited as per protocol but is slowly increasing.     Progress strengthening /stabilization /functional activity           Timed:  Manual Therapy:    17     mins  00229;  Therapeutic Exercise:    25/50     mins  14068;     Neuromuscular Alana:    0    mins  17013;    Therapeutic Activity:     0     mins  00221;     Ultrasound:      0     mins  56774;    Iontophoresis              __0_   mins  Dry Needling               _____   mins        Untimed:  Electrical Stimulation:     15    mins  69356 ( );  Mechanical Traction:             mins  92616;   Paraffin                       _____  mins     Timed Treatment:   42   mins   Total Treatment:     80   mins    Bhavna Davis PT  [place] License # [id number]  Physical Therapist    Electronically signed by Bhavna Davis PT, 22, 2:22 PM EDT

## 2022-08-29 ENCOUNTER — TREATMENT (OUTPATIENT)
Dept: PHYSICAL THERAPY | Facility: CLINIC | Age: 67
End: 2022-08-29

## 2022-08-29 DIAGNOSIS — Z96.612 STATUS POST TOTAL SHOULDER ARTHROPLASTY, LEFT: Primary | ICD-10-CM

## 2022-08-29 DIAGNOSIS — M25.512 ACUTE PAIN OF LEFT SHOULDER: ICD-10-CM

## 2022-08-29 DIAGNOSIS — R68.89 IMPAIRED FUNCTION OF UPPER EXTREMITY: ICD-10-CM

## 2022-08-29 PROCEDURE — G0283 ELEC STIM OTHER THAN WOUND: HCPCS | Performed by: PHYSICAL THERAPIST

## 2022-08-29 PROCEDURE — 97110 THERAPEUTIC EXERCISES: CPT | Performed by: PHYSICAL THERAPIST

## 2022-08-29 PROCEDURE — 97140 MANUAL THERAPY 1/> REGIONS: CPT | Performed by: PHYSICAL THERAPIST

## 2022-08-29 NOTE — PROGRESS NOTES
Physical Therapy Daily Treatment Note    Patient: Tati Hanley   : 1955  Referring practitioner: Vickey Hair MD  Today's Date: 2022  Patient seen for 12 sessions    Visit Diagnoses:    ICD-10-CM ICD-9-CM   1. Status post total shoulder arthroplasty, left  Z96.612 V43.61   2. Impaired function of upper extremity  R68.89 V49.5   3. Acute pain of left shoulder  M25.512 719.41       Subjective   Tati Hanley reports: that her shoulder is constantly sore but notes that her motion has increased.  Has been using ice and ibu.  Less tight.  Still unable to sleep a full night without pain interruption.  Still has popping in the shoulder but it is not painful at the time.  States that she thinks that she had a TIA this weekend as her sight was altered and her reasoning seemed to be diminished.  She states that the symptoms mostly resolved in about 20 minutes and was completely fine the next day.        Objective   Active flexion pre stretch - 92* abduction 57*    Passive flexion 123*, abduction 103*, IR 63*, ER 25*    See Exercise, Manual, and Modality Logs for complete treatment.     Patient Education: need for more fluid movement    Assessment/Plan  Tati is still very tight in all planes of motion.  Empty End Feel due to pain.  Flexion motion increased but abduction is still very limited.    Progress strengthening /stabilization /functional activity           Timed:  Manual Therapy:    17     mins  17091;  Therapeutic Exercise:    25/40     mins  52160;     Neuromuscular Alana:    0    mins  59246;    Therapeutic Activity:     0     mins  17299;     Ultrasound:      0     mins  57757;    Iontophoresis              __0_   mins  Dry Needling               _____   mins        Untimed:  Electrical Stimulation:     15    mins  99712 ( );  Mechanical Traction:             mins  02728;   Paraffin                       _____  mins     Timed Treatment:   42   mins   Total Treatment:     80    mins    Bhavna Davis, PT  [place] License # [id number]  Physical Therapist    Electronically signed by Bhavna Davis, PT, 08/29/22, 1:59 PM EDT

## 2022-09-01 ENCOUNTER — TREATMENT (OUTPATIENT)
Dept: PHYSICAL THERAPY | Facility: CLINIC | Age: 67
End: 2022-09-01

## 2022-09-01 DIAGNOSIS — Z96.612 STATUS POST TOTAL SHOULDER ARTHROPLASTY, LEFT: Primary | ICD-10-CM

## 2022-09-01 DIAGNOSIS — R68.89 IMPAIRED FUNCTION OF UPPER EXTREMITY: ICD-10-CM

## 2022-09-01 DIAGNOSIS — M25.512 ACUTE PAIN OF LEFT SHOULDER: ICD-10-CM

## 2022-09-01 PROCEDURE — 97110 THERAPEUTIC EXERCISES: CPT | Performed by: PHYSICAL THERAPIST

## 2022-09-01 PROCEDURE — G0283 ELEC STIM OTHER THAN WOUND: HCPCS | Performed by: PHYSICAL THERAPIST

## 2022-09-01 PROCEDURE — 97140 MANUAL THERAPY 1/> REGIONS: CPT | Performed by: PHYSICAL THERAPIST

## 2022-09-01 NOTE — PROGRESS NOTES
Physical Therapy Daily Treatment Note    Patient: Tati Hanley   : 1955  Referring practitioner: Vickey Hair MD  Today's Date: 2022  Patient seen for 13 sessions    Visit Diagnoses:    ICD-10-CM ICD-9-CM   1. Status post total shoulder arthroplasty, left  Z96.612 V43.61   2. Impaired function of upper extremity  R68.89 V49.5   3. Acute pain of left shoulder  M25.512 719.41       Subjective   Tati Hanley reports: that she has had wesley pain over the past few days.  Notes that she does have some anterior biceps pain.  The pain does seem to radiate as well is sore to touch.  Did have some scapular pain the first day but that has resolved.  She had some burning in the forearm but  That has resolved.  Still unable to sleep without pain interruption.  Still has been doing HEP despite increased symptoms.      Objective   Tenderness in left biceps muscle belly     Palpable pop/click in scapular region with scapular protraction/retraction    See Exercise, Manual, and Modality Logs for complete treatment.     Patient Education:    Assessment/Plan  Increaesd soreness but able to tolerate full exercise program today.  Still very tight especially in external rotation.  Compliant with exercise but having more pain/tightness than expected at this point post op.    Progress strengthening /stabilization /functional activity           Timed:  Manual Therapy:    16     mins  45066;  Therapeutic Exercise:    24/40     mins  96682;     Neuromuscular Alana:    0    mins  56012;    Therapeutic Activity:     0     mins  12905;     Ultrasound:      0     mins  80945;    Iontophoresis              __0_   mins  Dry Needling               _____   mins        Untimed:  Electrical Stimulation:     15    mins  27330 ( );  Mechanical Traction:             mins  09015;   Paraffin                       _____  mins     Timed Treatment:   40   mins   Total Treatment:     90   mins    Bhavna Davis, PT  [place] License  # [id number]  Physical Therapist    Electronically signed by Bhavna Davis, PT, 09/01/22, 1:58 PM EDT

## 2022-09-06 ENCOUNTER — TREATMENT (OUTPATIENT)
Dept: PHYSICAL THERAPY | Facility: CLINIC | Age: 67
End: 2022-09-06

## 2022-09-06 DIAGNOSIS — Z96.612 STATUS POST TOTAL SHOULDER ARTHROPLASTY, LEFT: Primary | ICD-10-CM

## 2022-09-06 DIAGNOSIS — R68.89 IMPAIRED FUNCTION OF UPPER EXTREMITY: ICD-10-CM

## 2022-09-06 DIAGNOSIS — M25.512 ACUTE PAIN OF LEFT SHOULDER: ICD-10-CM

## 2022-09-06 PROCEDURE — 97140 MANUAL THERAPY 1/> REGIONS: CPT | Performed by: PHYSICAL THERAPIST

## 2022-09-06 PROCEDURE — 97110 THERAPEUTIC EXERCISES: CPT | Performed by: PHYSICAL THERAPIST

## 2022-09-06 PROCEDURE — G0283 ELEC STIM OTHER THAN WOUND: HCPCS | Performed by: PHYSICAL THERAPIST

## 2022-09-06 NOTE — PROGRESS NOTES
Physical Therapy Daily Treatment Note    Patient: Tati Hanley   : 1955  Referring practitioner: Vickey Hair MD  Today's Date: 2022  Patient seen for 14 sessions    Visit Diagnoses:    ICD-10-CM ICD-9-CM   1. Status post total shoulder arthroplasty, left  Z96.612 V43.61   2. Impaired function of upper extremity  R68.89 V49.5   3. Acute pain of left shoulder  M25.512 719.41       Subjective   Tati Hanley reports: that she has been compliant with her therapy.  Thinks that her motion is increasing but her pain is unchanged.  Notes considerable tightness on Saturday after doing housework on Friday.  Complains of aching in the anterior shoulder with some soreness to touch in the biceps region.  States that the taping feels supportive and may decrease her aching.  States that the reverse motion on the [employer] UBE is more uncomfortable than the forward motion.      Objective   Passive Flexion 143*,  Abduction 127*,  ER 30*,  IR 60*    Active flexion 116*, abduction 77*    See Exercise, Manual, and Modality Logs for complete treatment.     Patient Education: stop isometrics and start tubing exercises    Assessment/Plan  Slow yet steady progress with her range and her pain levels.  Still very tight iin all planes of motion but very compliant with therapy    Progress strengthening /stabilization /functional activity           Timed:  Manual Therapy:    15/     mins  98340;  Therapeutic Exercise:    25/45     mins  56902;     Neuromuscular Alana:    0    mins  98435;    Therapeutic Activity:     0     mins  45950;     Ultrasound:      0     mins  13537;    Iontophoresis              __0_   mins  Dry Needling               _____   mins        Untimed:  Electrical Stimulation:     15    mins  56886 ( );  Mechanical Traction:             mins  09209;   Paraffin                       _____  mins     Timed Treatment:   40   mins   Total Treatment:     80   mins    Bhavna Davis, PT  [place] License  # [id number]  Physical Therapist    Electronically signed by Bhavna Davis, PT, 09/06/22, 2:09 PM EDT

## 2022-09-08 ENCOUNTER — TREATMENT (OUTPATIENT)
Dept: PHYSICAL THERAPY | Facility: CLINIC | Age: 67
End: 2022-09-08

## 2022-09-08 DIAGNOSIS — R68.89 IMPAIRED FUNCTION OF UPPER EXTREMITY: ICD-10-CM

## 2022-09-08 DIAGNOSIS — M25.512 ACUTE PAIN OF LEFT SHOULDER: ICD-10-CM

## 2022-09-08 DIAGNOSIS — Z96.612 STATUS POST TOTAL SHOULDER ARTHROPLASTY, LEFT: Primary | ICD-10-CM

## 2022-09-08 PROCEDURE — G0283 ELEC STIM OTHER THAN WOUND: HCPCS | Performed by: PHYSICAL THERAPIST

## 2022-09-08 PROCEDURE — 97140 MANUAL THERAPY 1/> REGIONS: CPT | Performed by: PHYSICAL THERAPIST

## 2022-09-08 PROCEDURE — 97110 THERAPEUTIC EXERCISES: CPT | Performed by: PHYSICAL THERAPIST

## 2022-09-08 NOTE — PROGRESS NOTES
Physical Therapy Daily Treatment Note    Patient: Tati Hanley   : 1955  Referring practitioner: Vickey Hair MD  Today's Date: 2022  Patient seen for 15 sessions    Visit Diagnoses:    ICD-10-CM ICD-9-CM   1. Status post total shoulder arthroplasty, left  Z96.612 V43.61   2. Impaired function of upper extremity  R68.89 V49.5   3. Acute pain of left shoulder  M25.512 719.41       Subjective   Tati Hanley reports: that she worked out hard yesterday at home and noted some sharp pains in the anterior shoulder with activity today.  Thinks that it may be a muscle spasm.  States that she was not able to sleep well at all last night.  Still more pain today.       Objective   Tenderness in anterior joint line    Considerable guarding into flexion and external rotation today    See Exercise, Manual, and Modality Logs for complete treatment.     Patient Education: work the arm but do not overdo it    Assessment/Plan  Tati was flared up today as she really pushed herself with exercises at home yesterday.  Unable to tolerate as much stretch or progression of exercises.  10 weeks post TSA and not to desired motion or strength levels    Progress strengthening /stabilization /functional activity           Timed:  Manual Therapy:    17     mins  67629;  Therapeutic Exercise:    25/40     mins  84826;     Neuromuscular Alana:    0    mins  30220;    Therapeutic Activity:     0     mins  32791;     Ultrasound:      0     mins  89543;    Iontophoresis              __0_   mins  Dry Needling               _____   mins        Untimed:  Electrical Stimulation:     15    mins  59085 ( );  Mechanical Traction:             mins  86514;   Paraffin                       _____  mins     Timed Treatment:   42   mins   Total Treatment:     80   mins    Bhavna Davis PT  [place] License # [id number]  Physical Therapist    Electronically signed by Bhavna Davis PT, 22, 2:07 PM EDT

## 2022-09-13 ENCOUNTER — TREATMENT (OUTPATIENT)
Dept: PHYSICAL THERAPY | Facility: CLINIC | Age: 67
End: 2022-09-13

## 2022-09-13 DIAGNOSIS — Z96.612 STATUS POST TOTAL SHOULDER ARTHROPLASTY, LEFT: Primary | ICD-10-CM

## 2022-09-13 DIAGNOSIS — M25.512 ACUTE PAIN OF LEFT SHOULDER: ICD-10-CM

## 2022-09-13 DIAGNOSIS — R68.89 IMPAIRED FUNCTION OF UPPER EXTREMITY: ICD-10-CM

## 2022-09-13 PROCEDURE — G0283 ELEC STIM OTHER THAN WOUND: HCPCS | Performed by: PHYSICAL THERAPIST

## 2022-09-13 PROCEDURE — 97140 MANUAL THERAPY 1/> REGIONS: CPT | Performed by: PHYSICAL THERAPIST

## 2022-09-13 PROCEDURE — 97110 THERAPEUTIC EXERCISES: CPT | Performed by: PHYSICAL THERAPIST

## 2022-09-13 NOTE — PROGRESS NOTES
Physical Therapy Daily Treatment Note    Patient: Tati Hanley   : 1955  Referring practitioner: Vickey Hair MD  Today's Date: 2022  Patient seen for 16 sessions    Visit Diagnoses:  No diagnosis found.    Subjective   Tati Hanley reports: that she has continued to have more pain than expected.  Does not recall any movement or occurrence that should have caused any increase in symptoms.  Now is having more cervical pain  With some radiation into the scapulae.        Objective   Tenderness to light palpation on the proximal to mid humerus in anterior and lateral surfaces.    See Exercise, Manual, and Modality Logs for complete treatment.     Patient Education: rest shoulder but do pendulum and pulley exercises    Assessment/Plan  Patient with increased symptoms today with actual tenderness to palpation of the proximal humerus.  NO increased tissue temperature.  Decreased toleration of exercises today.    Progress per Plan of Care  Assess status next visit and if still with increased symptoms encourage patient to see MD         Timed:  Manual Therapy:    17     mins  49587;  Therapeutic Exercise:    23/35     mins  83653;     Neuromuscular Alnaa:    0    mins  23610;    Therapeutic Activity:     0     mins  72308;     Ultrasound:      0     mins  78503;    Iontophoresis              __0_   mins  Dry Needling               _____   mins        Untimed:  Electrical Stimulation:     15    mins  41639 ( );  Mechanical Traction:             mins  89333;   Paraffin                       _____  mins     Timed Treatment:   40   mins   Total Treatment:     75   mins    Bhavna Davis PT  [place] License # [id number]  Physical Therapist    Electronically signed by Bhavna Davis PT, 22, 3:34 PM EDT

## 2022-09-15 ENCOUNTER — TREATMENT (OUTPATIENT)
Dept: PHYSICAL THERAPY | Facility: CLINIC | Age: 67
End: 2022-09-15

## 2022-09-15 DIAGNOSIS — M25.512 ACUTE PAIN OF LEFT SHOULDER: ICD-10-CM

## 2022-09-15 DIAGNOSIS — R68.89 IMPAIRED FUNCTION OF UPPER EXTREMITY: ICD-10-CM

## 2022-09-15 DIAGNOSIS — Z96.612 STATUS POST TOTAL SHOULDER ARTHROPLASTY, LEFT: Primary | ICD-10-CM

## 2022-09-15 PROCEDURE — 97140 MANUAL THERAPY 1/> REGIONS: CPT | Performed by: PHYSICAL THERAPIST

## 2022-09-15 PROCEDURE — G0283 ELEC STIM OTHER THAN WOUND: HCPCS | Performed by: PHYSICAL THERAPIST

## 2022-09-15 PROCEDURE — 97035 APP MDLTY 1+ULTRASOUND EA 15: CPT | Performed by: PHYSICAL THERAPIST

## 2022-09-15 NOTE — PROGRESS NOTES
Physical Therapy Daily Treatment Note    Patient: Tati Hanley   : 1955  Referring practitioner: Vickey Hair MD  Today's Date: 9/15/2022  Patient seen for 17 sessions    Visit Diagnoses:    ICD-10-CM ICD-9-CM   1. Status post total shoulder arthroplasty, left  Z96.612 V43.61   2. Impaired function of upper extremity  R68.89 V49.5   3. Acute pain of left shoulder  M25.512 719.41       Subjective   Tati Hanley reports: increased pain in the left upper trap and levator preventing sleep.  States that it hurts even worse when trying to raise the arm.  States that the upper arm pain persists and is still quite tender to touch.       Objective   Patient with significantly iincreaesd muscle tone in left upper trap and levators    Significant guarding with all shoulder movements    See Exercise, Manual, and Modality Logs for complete treatment.     Patient Education: use heat for cervical region, resume shoulder exercise program.    Assessment/Plan  Patient with such increased tone and tenderness in the left scapular/cervical muscles today.  Because of that pain, did not perform standard shoulder exercises and manual therapy today but did quite a bit of soft tissue work to the cervical/scapular muscles.  Able to relax them in hopes of full return to regular program upon next visit.     Progress per Plan of Care           Timed:  Manual Therapy:    25     mins  65206;  Therapeutic Exercise:    0     mins  36747;     Neuromuscular Alana:    0    mins  59550;    Therapeutic Activity:     5     mins  83185;   Activity modifications at home for next few days  Ultrasound:      8     mins  94665;    Iontophoresis              __0_   mins  Dry Needling               _____   mins        Untimed:  Electrical Stimulation:     15    mins  10132 ( );  Mechanical Traction:             mins  86399;   Paraffin                       _____  mins     Timed Treatment:   38   mins   Total Treatment:     60    mins    Bhavna Davis, PT  [place] License # [id number]  Physical Therapist    Electronically signed by Bhavna Davis, PT, 09/15/22, 10:31 AM EDT

## 2022-09-19 ENCOUNTER — TREATMENT (OUTPATIENT)
Dept: PHYSICAL THERAPY | Facility: CLINIC | Age: 67
End: 2022-09-19

## 2022-09-19 DIAGNOSIS — M25.512 ACUTE PAIN OF LEFT SHOULDER: ICD-10-CM

## 2022-09-19 DIAGNOSIS — Z96.612 STATUS POST TOTAL SHOULDER ARTHROPLASTY, LEFT: Primary | ICD-10-CM

## 2022-09-19 DIAGNOSIS — R68.89 IMPAIRED FUNCTION OF UPPER EXTREMITY: ICD-10-CM

## 2022-09-19 PROCEDURE — 97140 MANUAL THERAPY 1/> REGIONS: CPT | Performed by: PHYSICAL THERAPIST

## 2022-09-19 PROCEDURE — 97110 THERAPEUTIC EXERCISES: CPT | Performed by: PHYSICAL THERAPIST

## 2022-09-19 PROCEDURE — G0283 ELEC STIM OTHER THAN WOUND: HCPCS | Performed by: PHYSICAL THERAPIST

## 2022-09-19 NOTE — PROGRESS NOTES
Physical Therapy Daily Treatment Note    Patient: Tati Hanley   : 1955  Referring practitioner: Vickey Hair MD  Today's Date: 2022  Patient seen for 18 sessions    Visit Diagnoses:    ICD-10-CM ICD-9-CM   1. Status post total shoulder arthroplasty, left  Z96.612 V43.61   2. Impaired function of upper extremity  R68.89 V49.5   3. Acute pain of left shoulder  M25.512 719.41       Subjective   Tati Hanley reports: that she was able to do her exercises this weekend and has been icing more frequently.  Planted 2 flats of pansies today      Objective   Active flexion 103*, abduction 64*    Tenderness in upper arm - anterior and lateral     Deltoid   See Exercise, Manual, and Modality Logs for complete treatment.     Patient Education: follow lifting restrictions    Assessment/Plan  Less pain noted today but still more than expected at 3 months post TSR.  Motion still quite limited despite efforts of increasing it.  To see MD after next session    Progress per Plan of Care           Timed:  Manual Therapy:    15     mins  45721;  Therapeutic Exercise:    25/35     mins  04868;     Neuromuscular Alana:    0    mins  76086;    Therapeutic Activity:     0     mins  80949;     Ultrasound:      0     mins  79588;    Iontophoresis              __0_   mins  Dry Needling               _____   mins        Untimed:  Electrical Stimulation:     15    mins  94321 ( );  Mechanical Traction:             mins  95041;   Paraffin                       _____  mins     Timed Treatment:   40   mins   Total Treatment:     65   mins    Bhavna Davis PT  [place] License # [id number]  Physical Therapist    Electronically signed by Bhavna Davis PT, 22, 4:59 PM EDT

## 2022-09-22 ENCOUNTER — TREATMENT (OUTPATIENT)
Dept: PHYSICAL THERAPY | Facility: CLINIC | Age: 67
End: 2022-09-22

## 2022-09-22 DIAGNOSIS — Z96.612 STATUS POST TOTAL SHOULDER ARTHROPLASTY, LEFT: Primary | ICD-10-CM

## 2022-09-22 DIAGNOSIS — R68.89 IMPAIRED FUNCTION OF UPPER EXTREMITY: ICD-10-CM

## 2022-09-22 DIAGNOSIS — M25.512 ACUTE PAIN OF LEFT SHOULDER: ICD-10-CM

## 2022-09-22 PROCEDURE — 97530 THERAPEUTIC ACTIVITIES: CPT | Performed by: PHYSICAL THERAPIST

## 2022-09-22 PROCEDURE — G0283 ELEC STIM OTHER THAN WOUND: HCPCS | Performed by: PHYSICAL THERAPIST

## 2022-09-22 PROCEDURE — 97110 THERAPEUTIC EXERCISES: CPT | Performed by: PHYSICAL THERAPIST

## 2022-09-22 PROCEDURE — 97140 MANUAL THERAPY 1/> REGIONS: CPT | Performed by: PHYSICAL THERAPIST

## 2022-09-22 NOTE — PROGRESS NOTES
MD Letter - Reassessment  Patient: Tati Hanlye   : 1955  Vickey Hair MD  Date of Initial Visit: Type: THERAPY  Noted: 2022  Today's Date: 2022  Patient seen for 19 sessions    Treatment has included: therapeutic exercise, neuromuscular re-education, manual therapy, therapeutic activity, electrical stimulation and cryotherapy    Subjective   Tati states that she still has constant pain in the left shoulder varying form 3-7/10.   Most of the pain is in the anterior shoulder and biceps region.  She denies any numbness and tingling in the shoulder.  She states that she is taking ibuprofen bid and 1/2 Oxycodone at night.      Objective   Shoulder AROM/PROM - Flexion 114/135*,  Abduction  80*/125*,  ER 35/42*,  IR 55/61*  Strength - flexion 3+/5, abduction 3+/5, ER 3/5, IR 3+/5 all with pain inhibition   Sensation - intact  Palpation - tenderness in the incisional area and even tender to the touch along the anteror aspect of the proximal humerus  Activity tolerances - she is still unable to sleep without pain interruption.  She is able to perform all most self care skills independently but has modified how she does many things.      Assessment/Plan  Patient has demonstrated moderate improvement since the initiation of therapy.  The pain has remained problematic preventing progression of stretching and other exercise.  The motion has remained much more limited than desired despite considerable efforts of mobilization and stretching.  The activity tolerances have remained limited as well.  I feel that the patient would benefit from additional therapy..  If you have any questions concerning the care, please do not hesitate to contact me.          PT Signature: Bhavna Davis, PT  PT License #[id number]    Electronically signed by Bhavna Davis, PT, 22, 3:07 PM EDT    Manual Therapy:    14     mins  12020;  Therapeutic Exercise:    15/40     mins  01410;     Neuromuscular Alana:    0     mins  10098;    Therapeutic Activity:     10     mins  75108;    Ultrasound                     0 _  mins 35687  IElectrical stim              15      mins    Timed Treatment:   39   mins   Total Treatment:     70   mins

## 2022-09-26 ENCOUNTER — TREATMENT (OUTPATIENT)
Dept: PHYSICAL THERAPY | Facility: CLINIC | Age: 67
End: 2022-09-26

## 2022-09-26 DIAGNOSIS — R68.89 IMPAIRED FUNCTION OF UPPER EXTREMITY: ICD-10-CM

## 2022-09-26 DIAGNOSIS — Z96.612 STATUS POST TOTAL SHOULDER ARTHROPLASTY, LEFT: Primary | ICD-10-CM

## 2022-09-26 DIAGNOSIS — M25.512 ACUTE PAIN OF LEFT SHOULDER: ICD-10-CM

## 2022-09-26 PROCEDURE — 97140 MANUAL THERAPY 1/> REGIONS: CPT | Performed by: PHYSICAL THERAPIST

## 2022-09-26 PROCEDURE — 97110 THERAPEUTIC EXERCISES: CPT | Performed by: PHYSICAL THERAPIST

## 2022-09-26 PROCEDURE — G0283 ELEC STIM OTHER THAN WOUND: HCPCS | Performed by: PHYSICAL THERAPIST

## 2022-09-26 NOTE — PROGRESS NOTES
Physical Therapy Daily Treatment Note    Patient: Tati Hanley   : 1955  Referring practitioner: Vickey Hair MD  Today's Date: 2022  Patient seen for 20 sessions    Visit Diagnoses:    ICD-10-CM ICD-9-CM   1. Status post total shoulder arthroplasty, left  Z96.612 V43.61   2. Impaired function of upper extremity  R68.89 V49.5   3. Acute pain of left shoulder  M25.512 719.41       Subjective   Tati Hanley reports: that Dr Hair was concerned about her lack of motion and has prescribed a medrol pack.  He also ordered some lab work to rule out inflammation.  He discussed a possible manipulation.  Will see him again in 4 weeks.       Objective   Tenderness in anterior lateral shoulder    See Exercise, Manual, and Modality Logs for complete treatment.     Patient Education: discussed benefits of new medication    Assessment/Plan  Patient saw MD and is having further testing for source of inflammation.  Still with considerable pain at all end ranges    Progress strengthening /stabilization /functional activity           Timed:  Manual Therapy:    15     mins  00545;  Therapeutic Exercise:    15/35     mins  36094;     Neuromuscular Alana:    0    mins  25055;    Therapeutic Activity:     0     mins  77630;     Ultrasound:      0     mins  72215;    Iontophoresis              __0_   mins  Dry Needling               _____   mins        Untimed:  Electrical Stimulation:     15    mins  89290 ( );  Mechanical Traction:             mins  64505;   Paraffin                       _____  mins     Timed Treatment:   30   mins   Total Treatment:     60   mins    Bhavna Davis PT  [place] License # [id number]  Physical Therapist    Electronically signed by Bhavna Davis PT, 22, 3:07 PM EDT

## 2022-09-27 ENCOUNTER — LAB (OUTPATIENT)
Dept: LAB | Facility: HOSPITAL | Age: 67
End: 2022-09-27

## 2022-09-27 ENCOUNTER — TRANSCRIBE ORDERS (OUTPATIENT)
Dept: ADMINISTRATIVE | Facility: HOSPITAL | Age: 67
End: 2022-09-27

## 2022-09-27 DIAGNOSIS — M25.512 LEFT SHOULDER PAIN, UNSPECIFIED CHRONICITY: ICD-10-CM

## 2022-09-27 DIAGNOSIS — M25.512 LEFT SHOULDER PAIN, UNSPECIFIED CHRONICITY: Primary | ICD-10-CM

## 2022-09-27 LAB
ALBUMIN SERPL-MCNC: 4.7 G/DL (ref 3.5–5.2)
ALBUMIN/GLOB SERPL: 2.2 G/DL
ALP SERPL-CCNC: 62 U/L (ref 39–117)
ALT SERPL W P-5'-P-CCNC: 17 U/L (ref 1–33)
ANION GAP SERPL CALCULATED.3IONS-SCNC: 10.5 MMOL/L (ref 5–15)
AST SERPL-CCNC: 21 U/L (ref 1–32)
BASOPHILS # BLD AUTO: 0.07 10*3/MM3 (ref 0–0.2)
BASOPHILS NFR BLD AUTO: 1 % (ref 0–1.5)
BILIRUB SERPL-MCNC: 0.3 MG/DL (ref 0–1.2)
BUN SERPL-MCNC: 17 MG/DL (ref 8–23)
BUN/CREAT SERPL: 24.6 (ref 7–25)
CALCIUM SPEC-SCNC: 9.7 MG/DL (ref 8.6–10.5)
CHLORIDE SERPL-SCNC: 102 MMOL/L (ref 98–107)
CO2 SERPL-SCNC: 28.5 MMOL/L (ref 22–29)
CREAT SERPL-MCNC: 0.69 MG/DL (ref 0.57–1)
CRP SERPL-MCNC: <0.3 MG/DL (ref 0–0.5)
DEPRECATED RDW RBC AUTO: 39.3 FL (ref 37–54)
EGFRCR SERPLBLD CKD-EPI 2021: 97.1 ML/MIN/1.73
EOSINOPHIL # BLD AUTO: 0.7 10*3/MM3 (ref 0–0.4)
EOSINOPHIL NFR BLD AUTO: 10.1 % (ref 0.3–6.2)
ERYTHROCYTE [DISTWIDTH] IN BLOOD BY AUTOMATED COUNT: 12.7 % (ref 12.3–15.4)
ERYTHROCYTE [SEDIMENTATION RATE] IN BLOOD: 7 MM/HR (ref 0–30)
GLOBULIN UR ELPH-MCNC: 2.1 GM/DL
GLUCOSE SERPL-MCNC: 93 MG/DL (ref 65–99)
HCT VFR BLD AUTO: 40.2 % (ref 34–46.6)
HGB BLD-MCNC: 13.7 G/DL (ref 12–15.9)
IMM GRANULOCYTES # BLD AUTO: 0.01 10*3/MM3 (ref 0–0.05)
IMM GRANULOCYTES NFR BLD AUTO: 0.1 % (ref 0–0.5)
LYMPHOCYTES # BLD AUTO: 2.8 10*3/MM3 (ref 0.7–3.1)
LYMPHOCYTES NFR BLD AUTO: 40.5 % (ref 19.6–45.3)
MCH RBC QN AUTO: 28.8 PG (ref 26.6–33)
MCHC RBC AUTO-ENTMCNC: 34.1 G/DL (ref 31.5–35.7)
MCV RBC AUTO: 84.5 FL (ref 79–97)
MONOCYTES # BLD AUTO: 0.49 10*3/MM3 (ref 0.1–0.9)
MONOCYTES NFR BLD AUTO: 7.1 % (ref 5–12)
NEUTROPHILS NFR BLD AUTO: 2.84 10*3/MM3 (ref 1.7–7)
NEUTROPHILS NFR BLD AUTO: 41.2 % (ref 42.7–76)
NRBC BLD AUTO-RTO: 0 /100 WBC (ref 0–0.2)
PLATELET # BLD AUTO: 253 10*3/MM3 (ref 140–450)
PMV BLD AUTO: 10.1 FL (ref 6–12)
POTASSIUM SERPL-SCNC: 4.1 MMOL/L (ref 3.5–5.2)
PROT SERPL-MCNC: 6.8 G/DL (ref 6–8.5)
RBC # BLD AUTO: 4.76 10*6/MM3 (ref 3.77–5.28)
SODIUM SERPL-SCNC: 141 MMOL/L (ref 136–145)
WBC NRBC COR # BLD: 6.91 10*3/MM3 (ref 3.4–10.8)

## 2022-09-27 PROCEDURE — 86140 C-REACTIVE PROTEIN: CPT

## 2022-09-27 PROCEDURE — 36415 COLL VENOUS BLD VENIPUNCTURE: CPT

## 2022-09-27 PROCEDURE — 80053 COMPREHEN METABOLIC PANEL: CPT

## 2022-09-27 PROCEDURE — 85025 COMPLETE CBC W/AUTO DIFF WBC: CPT

## 2022-09-27 PROCEDURE — 85652 RBC SED RATE AUTOMATED: CPT

## 2022-09-27 PROCEDURE — 83529 ASAY OF INTERLEUKIN-6 (IL-6): CPT

## 2022-09-28 LAB — IL6 SERPL-MCNC: <2.5 PG/ML (ref 0–13)

## 2022-09-29 ENCOUNTER — TREATMENT (OUTPATIENT)
Dept: PHYSICAL THERAPY | Facility: CLINIC | Age: 67
End: 2022-09-29

## 2022-09-29 DIAGNOSIS — Z96.612 STATUS POST TOTAL SHOULDER ARTHROPLASTY, LEFT: Primary | ICD-10-CM

## 2022-09-29 DIAGNOSIS — M25.512 ACUTE PAIN OF LEFT SHOULDER: ICD-10-CM

## 2022-09-29 DIAGNOSIS — R68.89 IMPAIRED FUNCTION OF UPPER EXTREMITY: ICD-10-CM

## 2022-09-29 PROCEDURE — 97140 MANUAL THERAPY 1/> REGIONS: CPT | Performed by: PHYSICAL THERAPIST

## 2022-09-29 PROCEDURE — G0283 ELEC STIM OTHER THAN WOUND: HCPCS | Performed by: PHYSICAL THERAPIST

## 2022-09-29 PROCEDURE — 97110 THERAPEUTIC EXERCISES: CPT | Performed by: PHYSICAL THERAPIST

## 2022-10-05 ENCOUNTER — TREATMENT (OUTPATIENT)
Dept: PHYSICAL THERAPY | Facility: CLINIC | Age: 67
End: 2022-10-05

## 2022-10-05 DIAGNOSIS — Z96.612 STATUS POST TOTAL SHOULDER ARTHROPLASTY, LEFT: Primary | ICD-10-CM

## 2022-10-05 DIAGNOSIS — R68.89 IMPAIRED FUNCTION OF UPPER EXTREMITY: ICD-10-CM

## 2022-10-05 DIAGNOSIS — M25.512 ACUTE PAIN OF LEFT SHOULDER: ICD-10-CM

## 2022-10-05 PROCEDURE — 97530 THERAPEUTIC ACTIVITIES: CPT | Performed by: PHYSICAL THERAPIST

## 2022-10-05 PROCEDURE — G0283 ELEC STIM OTHER THAN WOUND: HCPCS | Performed by: PHYSICAL THERAPIST

## 2022-10-05 PROCEDURE — 97140 MANUAL THERAPY 1/> REGIONS: CPT | Performed by: PHYSICAL THERAPIST

## 2022-10-05 PROCEDURE — 97110 THERAPEUTIC EXERCISES: CPT | Performed by: PHYSICAL THERAPIST

## 2022-10-05 NOTE — PROGRESS NOTES
Physical Therapy Daily Treatment Note  [facility] Physical Therapy [facility]  [address], [address]  [place] [address]    Patient: Tati Hanley   : 1955  Referring practitioner: Vickey Hair MD  Today's Date: 10/5/2022  Patient seen for 22 sessions    Visit Diagnoses:    ICD-10-CM ICD-9-CM   1. Status post total shoulder arthroplasty, left  Z96.612 V43.61   2. Impaired function of upper extremity  R68.89 V49.5   3. Acute pain of left shoulder  M25.512 719.41       Subjective   Tati Hanley reports: that she is still very sore and does not feel that she has responded to the steroids.  Concerned she may need to have another procedure.       Objective   Supine flexion 133*, abduction 117*, ER 40*, IR 60*    See Exercise, Manual, and Modality Logs for complete treatment.     Patient Education: may still get some improvement the next week    Assessment/Plan  Pain still preventing any increase in range.  Very painful end feel.  Does feel a bit better with gentle distraction of the joint.  Slight increase in active motion since last MD visit    Progress per Plan of Care           Timed:  Manual Therapy:    17     mins  02683;  Therapeutic Exercise:    15/40     mins  95650;     Neuromuscular Alana:    0    mins  13653;    Therapeutic Activity:     10     mins  59652;     Ultrasound:      0     mins  90973;    Iontophoresis              __0_   mins  Dry Needling               _____   mins        Untimed:  Electrical Stimulation:     17    mins  26154 (MC );  Mechanical Traction:             mins  90261;   Paraffin                       _____  mins     Timed Treatment:   42   mins   Total Treatment:     75   mins    Bhavna Davis PT  [place] License # [id number]  Physical Therapist    Electronically signed by Bhavna Davis PT, 10/05/22, 3:47 PM EDT

## 2022-10-07 ENCOUNTER — TREATMENT (OUTPATIENT)
Dept: PHYSICAL THERAPY | Facility: CLINIC | Age: 67
End: 2022-10-07

## 2022-10-07 DIAGNOSIS — R68.89 IMPAIRED FUNCTION OF UPPER EXTREMITY: ICD-10-CM

## 2022-10-07 DIAGNOSIS — Z96.612 STATUS POST TOTAL SHOULDER ARTHROPLASTY, LEFT: Primary | ICD-10-CM

## 2022-10-07 PROCEDURE — G0283 ELEC STIM OTHER THAN WOUND: HCPCS | Performed by: PHYSICAL THERAPIST

## 2022-10-07 PROCEDURE — 97110 THERAPEUTIC EXERCISES: CPT | Performed by: PHYSICAL THERAPIST

## 2022-10-07 PROCEDURE — 97035 APP MDLTY 1+ULTRASOUND EA 15: CPT | Performed by: PHYSICAL THERAPIST

## 2022-10-07 PROCEDURE — 97140 MANUAL THERAPY 1/> REGIONS: CPT | Performed by: PHYSICAL THERAPIST

## 2022-10-07 NOTE — PROGRESS NOTES
Physical Therapy Daily Progress Note    Visit Diagnoses:    ICD-10-CM ICD-9-CM   1. Status post total shoulder arthroplasty, left  Z96.612 V43.61   2. Impaired function of upper extremity  R68.89 V49.5       VISIT#: 23      Tati Hanley reports: She had some mm spasms across her chest, beside the sternum on both sides. Her neck has been bothering her - she feels irritation and tightness on her L side. Pain has been waking her up all night. Her shoulder may be impinged. She may have to have additional medical intervention because of her range of motion and pain.   Current Pain Level:    4/10 shoulder; Worst:   4/10; Best:  0/10  Location Of Pain: anterior lateral L shoulde   Quality of Pain: spasms, aching, tightness  Response to Previous Session: Good. No issues  Functional Deficits/Irritating Factors: reaching up and out, reaching behind her back  Progression: worsening  Compliance with HEP Reported: Yes    Objective    Increased sets/reps of:  none   Increased resistance on:  none  Added to Program: none        See Exercise, Manual, and Modality Logs for complete treatment.     Patient Education: Pt was educated on exercise biomechanical correctness, intensity, and speed.     Assessment:  Pain still preventing any increase in range.  Very painful end feel. Tender in L UT, during STM from compensatory movement.  Pt will continue to benefit from skilled PT interventions to address current functional deficits and impairments.       Plan: Progress to/Continue with current program.       Timed:  Manual Therapy:            15_    mins  81898;  Ultrasound:                     10      mins  14059;   Therapeutic Exercise:    15/35     mins  12594;     Neuromuscular Alana:   0     mins  48787;    Therapeutic Activity:      0     mins  96487;      Iontophoresis              _0__   mins  Dry Needling               _0____   mins         Untimed:  Work Conditioning: __0__ mins 21824  Electrical Stimulation:    15    mins   42886 ( );  Mechanical Traction:       0        mins  10060;   Paraffin                       __0___  mins   Ice/Heat: __15 with IFC__ mins  75    Timed Treatment:   30   mins   Total Treatment:    75    mins          Latrice oWods PTA  [place] License # [id number]  Physical Therapist Assistant

## 2022-10-13 ENCOUNTER — TREATMENT (OUTPATIENT)
Dept: PHYSICAL THERAPY | Facility: CLINIC | Age: 67
End: 2022-10-13

## 2022-10-13 DIAGNOSIS — R68.89 IMPAIRED FUNCTION OF UPPER EXTREMITY: ICD-10-CM

## 2022-10-13 DIAGNOSIS — M25.512 ACUTE PAIN OF LEFT SHOULDER: ICD-10-CM

## 2022-10-13 DIAGNOSIS — Z96.612 STATUS POST TOTAL SHOULDER ARTHROPLASTY, LEFT: Primary | ICD-10-CM

## 2022-10-13 PROCEDURE — 97140 MANUAL THERAPY 1/> REGIONS: CPT | Performed by: PHYSICAL THERAPIST

## 2022-10-13 PROCEDURE — G0283 ELEC STIM OTHER THAN WOUND: HCPCS | Performed by: PHYSICAL THERAPIST

## 2022-10-13 PROCEDURE — 97110 THERAPEUTIC EXERCISES: CPT | Performed by: PHYSICAL THERAPIST

## 2022-10-13 NOTE — PROGRESS NOTES
Physical Therapy Daily Treatment Note  [facility] Physical Therapy [facility]  [address], [address]  [place] [address]    Patient: Tati Hanley   : 1955  Referring practitioner: Vickey Hair MD  Today's Date: 10/13/2022  Patient seen for 24 sessions    Visit Diagnoses:    ICD-10-CM ICD-9-CM   1. Status post total shoulder arthroplasty, left  Z96.612 V43.61   2. Impaired function of upper extremity  R68.89 V49.5   3. Acute pain of left shoulder  M25.512 719.41       Subjective   Tati Hanley reports: that she does not think that the steroids have helped at all.  States that she is having pain - constant in the anterior shoulder radiating into the upper arm to the elbow.      Objective   Palpation - tenderness anterior joint line, very tender along the biceps tendon    See Exercise, Manual, and Modality Logs for complete treatment.     Patient Education: cont stretching exercises but temporarily stop the strengthening exercises    Assessment/Plan  Tati has had some increased pain which has caused some decrease in shoulder motion.  Did improve with manual therapy today.  Continues to have tightness in cervical region as well.    Patient out of town for 10 days.  Will reassess for MD upon return           Timed:  Manual Therapy:    15     mins  73625;  Therapeutic Exercise:    23/40     mins  82214;     Neuromuscular Alana:    0    mins  24623;    Therapeutic Activity:     0     mins  40369;     Ultrasound:      0     mins  15393;    Iontophoresis              __0_   mins  Dry Needling               _____   mins        Untimed:  Electrical Stimulation:     15    mins  29164 ( );  Mechanical Traction:             mins  98253;   Paraffin                       _____  mins     Timed Treatment:   38   mins   Total Treatment:     70   mins    Bhavna Davis PT  [place] License # [id number]  Physical Therapist    Electronically signed by Bhavna Davis PT, 10/13/22, 11:06 AM  EDT

## 2022-10-24 ENCOUNTER — TREATMENT (OUTPATIENT)
Dept: PHYSICAL THERAPY | Facility: CLINIC | Age: 67
End: 2022-10-24

## 2022-10-24 DIAGNOSIS — M25.512 ACUTE PAIN OF LEFT SHOULDER: ICD-10-CM

## 2022-10-24 DIAGNOSIS — Z96.612 STATUS POST TOTAL SHOULDER ARTHROPLASTY, LEFT: Primary | ICD-10-CM

## 2022-10-24 DIAGNOSIS — R68.89 IMPAIRED FUNCTION OF UPPER EXTREMITY: ICD-10-CM

## 2022-10-24 PROCEDURE — G0283 ELEC STIM OTHER THAN WOUND: HCPCS | Performed by: PHYSICAL THERAPIST

## 2022-10-24 PROCEDURE — 97110 THERAPEUTIC EXERCISES: CPT | Performed by: PHYSICAL THERAPIST

## 2022-10-24 PROCEDURE — 97140 MANUAL THERAPY 1/> REGIONS: CPT | Performed by: PHYSICAL THERAPIST

## 2022-10-24 PROCEDURE — 97530 THERAPEUTIC ACTIVITIES: CPT | Performed by: PHYSICAL THERAPIST

## 2022-10-24 NOTE — PROGRESS NOTES
MD Letter - Reassessment  [facility] - [facility]  [address]  [place] [id number]  Patient: Tati Hanley   : 1955  Vickey Hair MD  Date of Initial Visit: Type: THERAPY  Noted: 2022  Today's Date: 10/24/2022  Patient seen for 25 sessions    Treatment has included: therapeutic exercise, neuromuscular re-education, manual therapy, therapeutic activity, electrical stimulation and cryotherapy    Patient was out of town last week so was not seen in therapy     Subjective   Tati states that she no longer has constant pain but has pain with movement.  States that her pain varies from 0-5/10.  States that the pain is in the anterior shoulder running down into the biceps muscle to the elbow.  She states that she is taking only occasional hydrocodone and cuts it in half and takes with ibuprofen a couple times a week.  She states that she does not think that the steroids were of any benefit at all.      Objective   Shoulder AROM/PROM - Flexion 110/148*,  Abduction 78/135*,  ER 38/51*,  IR 60/68*  Strength - 3+/5 in general for left shoulder strength with pain inhibition throughout testing  Sensation - intact  Palpation - tenderness along incision site and anterior joint line.  NO palpable pop/click with motion   Activity tolerances - she is still unable to sleep without pain interruption.  She is able to perform all self care skills independently but does still struggle washing/fixing her hair.       Assessment/Plan  Patient has demonstrated moderate improvement since the initiation of therapy but has had some increased pain in the past few weeks.  The pain has actually increased with active elevation.  The passive motion has increased but the active motion has actually decreased.  The activity tolerances have remained more limited than desired. .  I feel that the patient would benefit from additional therapy versus other medical intervention.  If  you have any questions concerning the care, please do not hesitate to contact me.          PT Signature: Bhavna Davis, PT  PT License #[id number]    Electronically signed by Bhavna Davis, PT, 10/24/22, 4:23 PM EDT    Manual Therapy:    15     mins  59693;  Therapeutic Exercise:    15/35     mins  95805;     Neuromuscular Alana:    0    mins  07420;    Therapeutic Activity:     10     mins  23260;    Ultrasound                     0 _  mins 29522  Iontophoresis              ______  Electrical Stimulation  __15____mins 16298    Timed Treatment:   40   mins   Total Treatment:     70   mins

## 2022-10-27 ENCOUNTER — TREATMENT (OUTPATIENT)
Dept: PHYSICAL THERAPY | Facility: CLINIC | Age: 67
End: 2022-10-27

## 2022-10-27 DIAGNOSIS — Z96.612 STATUS POST TOTAL SHOULDER ARTHROPLASTY, LEFT: Primary | ICD-10-CM

## 2022-10-27 DIAGNOSIS — R68.89 IMPAIRED FUNCTION OF UPPER EXTREMITY: ICD-10-CM

## 2022-10-27 DIAGNOSIS — M25.512 ACUTE PAIN OF LEFT SHOULDER: ICD-10-CM

## 2022-10-27 PROCEDURE — 97140 MANUAL THERAPY 1/> REGIONS: CPT | Performed by: PHYSICAL THERAPIST

## 2022-10-27 PROCEDURE — 97530 THERAPEUTIC ACTIVITIES: CPT | Performed by: PHYSICAL THERAPIST

## 2022-10-27 PROCEDURE — G0283 ELEC STIM OTHER THAN WOUND: HCPCS | Performed by: PHYSICAL THERAPIST

## 2022-10-27 PROCEDURE — 97110 THERAPEUTIC EXERCISES: CPT | Performed by: PHYSICAL THERAPIST

## 2022-10-27 NOTE — PROGRESS NOTES
Physical Therapy Daily Treatment Note  [facility] Physical Therapy [facility]  [address], [address]  [place] [address]    Patient: Tati Hanley   : 1955  Referring practitioner: Vickey Hair MD  Today's Date: 10/27/2022  Patient seen for 26 sessions    Visit Diagnoses:    ICD-10-CM ICD-9-CM   1. Status post total shoulder arthroplasty, left  Z96.612 V43.61   2. Impaired function of upper extremity  R68.89 V49.5   3. Acute pain of left shoulder  M25.512 719.41       Subjective   Tati Talon reports: that the MD discussed doing an adhesion lysis procedure.  He will let her do 6 more weeks of therapy but will need to make some decisions at that time.  States that she is concerned about re development of scar tissue if she has the procedure.        Objective   Passive flexion 150    See Exercise, Manual, and Modality Logs for complete treatment.     Patient Education: Lengthy discussion of pros and cons of MD proposed surgical procedure.  Discussed post op course for lysis release vs TSA.    Assessment/Plan  Patient very frustrated with persistent pain and lack of motion.  Considering her options at this point as whether to have the lysis release or not.  Considering possible second opinion.  Vary compliant with therapy but is having much more pain than desired.    Progress strengthening /stabilization /functional activity           Timed:  Manual Therapy:    15     mins  55563;  Therapeutic Exercise:    10/20     mins  17620;     Neuromuscular Alana:    0    mins  95802;    Therapeutic Activity:     15/25     mins  55442;     Ultrasound:      0     mins  67315;    Iontophoresis              __0_   mins  Dry Needling               _____   mins        Untimed:  Electrical Stimulation:     15    mins  04320 ( );  Mechanical Traction:             mins  29294;   Paraffin                       _____  mins     Timed Treatment:   40   mins   Total Treatment:     80    mins    Bhavna Davis, PT  [place] License # [id number]  Physical Therapist    Electronically signed by Bhavna Davis, PT, 10/27/22, 9:12 AM EDT

## 2022-10-31 ENCOUNTER — TREATMENT (OUTPATIENT)
Dept: PHYSICAL THERAPY | Facility: CLINIC | Age: 67
End: 2022-10-31

## 2022-10-31 DIAGNOSIS — R68.89 IMPAIRED FUNCTION OF UPPER EXTREMITY: ICD-10-CM

## 2022-10-31 DIAGNOSIS — Z96.612 STATUS POST TOTAL SHOULDER ARTHROPLASTY, LEFT: Primary | ICD-10-CM

## 2022-10-31 DIAGNOSIS — M25.512 ACUTE PAIN OF LEFT SHOULDER: ICD-10-CM

## 2022-10-31 PROCEDURE — G0283 ELEC STIM OTHER THAN WOUND: HCPCS | Performed by: PHYSICAL THERAPIST

## 2022-10-31 PROCEDURE — 97110 THERAPEUTIC EXERCISES: CPT | Performed by: PHYSICAL THERAPIST

## 2022-10-31 PROCEDURE — 97140 MANUAL THERAPY 1/> REGIONS: CPT | Performed by: PHYSICAL THERAPIST

## 2022-11-03 ENCOUNTER — TREATMENT (OUTPATIENT)
Dept: PHYSICAL THERAPY | Facility: CLINIC | Age: 67
End: 2022-11-03

## 2022-11-03 DIAGNOSIS — M25.512 ACUTE PAIN OF LEFT SHOULDER: ICD-10-CM

## 2022-11-03 DIAGNOSIS — R68.89 IMPAIRED FUNCTION OF UPPER EXTREMITY: ICD-10-CM

## 2022-11-03 DIAGNOSIS — Z96.612 STATUS POST TOTAL SHOULDER ARTHROPLASTY, LEFT: Primary | ICD-10-CM

## 2022-11-03 PROCEDURE — G0283 ELEC STIM OTHER THAN WOUND: HCPCS | Performed by: PHYSICAL THERAPIST

## 2022-11-03 PROCEDURE — 97110 THERAPEUTIC EXERCISES: CPT | Performed by: PHYSICAL THERAPIST

## 2022-11-03 PROCEDURE — 97140 MANUAL THERAPY 1/> REGIONS: CPT | Performed by: PHYSICAL THERAPIST

## 2022-11-03 NOTE — PROGRESS NOTES
Physical Therapy Daily Treatment Note  [facility] Physical Therapy [facility]  [address], [address]  [place] [address]    Patient: Tati Hanley   : 1955  Referring practitioner: Vickey Hair MD  Today's Date: 11/3/2022  Patient seen for 28 sessions    Visit Diagnoses:    ICD-10-CM ICD-9-CM   1. Status post total shoulder arthroplasty, left  Z96.612 V43.61   2. Impaired function of upper extremity  R68.89 V49.5   3. Acute pain of left shoulder  M25.512 719.41       Subjective   Tati Hanley reports: that her arm is feeling a bit better today.  Her pain levels are not quite as bad as they had been.  Does report that doing reverse on the UBE seems to cause tightness from the clavicle into the neck.  States that she is going to ask Dr Hiar for another MRI to see what is going on in the shoulder prior to possible surgery.        Objective   Supine active  flexion 140*,  Abduction 128*,  ER 46*,  IR 60*    Seated flexion 115*,  Abduction 78*    See Exercise, Manual, and Modality Logs for complete treatment.     Patient Education: avoid painful exercise    Assessment/Plan  Tati had less pain today but still with limited mobility.  Most pain is in the biceps region. 4 months post TSR    Progress strengthening /stabilization /functional activity           Timed:  Manual Therapy:    16     mins  71074;  Therapeutic Exercise:    25/50     mins  41503;     Neuromuscular Alana:    0    mins  74464;    Therapeutic Activity:     0     mins  54518;     Ultrasound:      0     mins  64457;    Iontophoresis              __0_   mins  Dry Needling               _____   mins        Untimed:  Electrical Stimulation:     15    mins  98254 ( );  Mechanical Traction:             mins  70692;   Paraffin                       _____  mins     Timed Treatment:   41   mins   Total Treatment:     80   mins    Bhavna Davis, PT  [place] License # [id number]  Physical Therapist    Electronically  signed by Bhavna Davis, PT, 11/03/22, 3:01 PM EDT

## 2022-11-07 ENCOUNTER — TREATMENT (OUTPATIENT)
Dept: PHYSICAL THERAPY | Facility: CLINIC | Age: 67
End: 2022-11-07

## 2022-11-07 DIAGNOSIS — M25.512 ACUTE PAIN OF LEFT SHOULDER: ICD-10-CM

## 2022-11-07 DIAGNOSIS — Z96.612 STATUS POST TOTAL SHOULDER ARTHROPLASTY, LEFT: Primary | ICD-10-CM

## 2022-11-07 DIAGNOSIS — M19.019 SHOULDER ARTHRITIS: ICD-10-CM

## 2022-11-07 DIAGNOSIS — R68.89 IMPAIRED FUNCTION OF UPPER EXTREMITY: ICD-10-CM

## 2022-11-07 PROCEDURE — 97110 THERAPEUTIC EXERCISES: CPT | Performed by: PHYSICAL THERAPIST

## 2022-11-07 PROCEDURE — 97140 MANUAL THERAPY 1/> REGIONS: CPT | Performed by: PHYSICAL THERAPIST

## 2022-11-07 PROCEDURE — G0283 ELEC STIM OTHER THAN WOUND: HCPCS | Performed by: PHYSICAL THERAPIST

## 2022-11-07 NOTE — PROGRESS NOTES
Physical Therapy Daily Treatment Note  [facility] Physical Therapy [facility]  [address]  [place] [address]    Patient: Tati Hanley   : 1955  Referring practitioner: Vickey Hair MD  Today's Date: 2022  Patient seen for 29 sessions    Visit Diagnoses:    ICD-10-CM ICD-9-CM   1. Status post total shoulder arthroplasty, left  Z96.612 V43.61   2. Impaired function of upper extremity  R68.89 V49.5   3. Acute pain of left shoulder  M25.512 719.41   4. Shoulder arthritis  M19.019 716.91       Subjective   Tati Hanley reports: that she is having pain around her left AC joint.  Still not sleeping well.        Objective   Increased tone in left scalenes    Active flexion - 100*    See Exercise, Manual, and Modality Logs for complete treatment.     Patient Education: Re instructed in proper exercise technique    Assessment/Plan  Persistent pain and guarding of left shoulder motion post TSR.  Active elevation still limited by able to hold at 120* flexion if assisted to that position.  Excessive recruitment of cervical musculature    Progress strengthening /stabilization /functional activity           Timed:  Manual Therapy:    15     mins  68962;  Therapeutic Exercise:    25/40     mins  93870;     Neuromuscular Alana:    0    mins  96979;    Therapeutic Activity:     0     mins  40118;     Ultrasound:      0     mins  23643;    Iontophoresis              __0_   mins  Dry Needling               _____   mins        Untimed:  Electrical Stimulation:     15    mins  71636 ( );  Mechanical Traction:             mins  70142;   Paraffin                       _____  mins     Timed Treatment:   40   mins   Total Treatment:     75   mins    Bhavna Davis PT  [place] License # [id number]  Physical Therapist    Electronically signed by Bhavna Davis, PT, 22, 3:14 PM EST

## 2022-11-10 ENCOUNTER — TELEPHONE (OUTPATIENT)
Dept: PHYSICAL THERAPY | Facility: OTHER | Age: 67
End: 2022-11-10

## 2022-11-14 ENCOUNTER — TREATMENT (OUTPATIENT)
Dept: PHYSICAL THERAPY | Facility: CLINIC | Age: 67
End: 2022-11-14

## 2022-11-14 DIAGNOSIS — M25.512 ACUTE PAIN OF LEFT SHOULDER: ICD-10-CM

## 2022-11-14 DIAGNOSIS — Z96.612 STATUS POST TOTAL SHOULDER ARTHROPLASTY, LEFT: Primary | ICD-10-CM

## 2022-11-14 DIAGNOSIS — R68.89 IMPAIRED FUNCTION OF UPPER EXTREMITY: ICD-10-CM

## 2022-11-14 PROCEDURE — 97110 THERAPEUTIC EXERCISES: CPT | Performed by: PHYSICAL THERAPIST

## 2022-11-14 PROCEDURE — 97140 MANUAL THERAPY 1/> REGIONS: CPT | Performed by: PHYSICAL THERAPIST

## 2022-11-14 NOTE — PROGRESS NOTES
Physical Therapy Daily Treatment Note  [facility] Physical Therapy [facility]  [address], [address]  [place] [address]    Patient: Tati Hanley   : 1955  Referring practitioner: Vickey Hair MD  Today's Date: 2022  Patient seen for 30 sessions    Visit Diagnoses:    ICD-10-CM ICD-9-CM   1. Status post total shoulder arthroplasty, left  Z96.612 V43.61   2. Impaired function of upper extremity  R68.89 V49.5   3. Acute pain of left shoulder  M25.512 719.41       Subjective   Tati Hanley reports: that she is to have an MRI next week to check the positioning of her shoulder.  Then to see Dr Hiar the following week to discuss results.  States the most pain is in the anterior shoulder pain.       Objective   Tenderness in biceps muscle belly    Passive flexion  149*,  Abduction 150*     See Exercise, Manual, and Modality Logs for complete treatment.     Patient Education: cont to put left arm in sleeve first    Assessment/Plan  Less pain with some increase in passive motion today.  Rotation still quite painful.  Nearly 5 months post TSA.  Await MRI    Progress strengthening /stabilization /functional activity           Timed:  Manual Therapy:    17     mins  10305;  Therapeutic Exercise:    23/50     mins  78020;     Neuromuscular Alana:    0    mins  67495;    Therapeutic Activity:     0     mins  37532;     Ultrasound:      0     mins  84446;    Iontophoresis              __0_   mins  Dry Needling               _____   mins        Untimed:  Electrical Stimulation:     0    mins  40038 ( );  Mechanical Traction:             mins  98037;   Paraffin                       _____  mins     Timed Treatment:   40   mins   Total Treatment:     65   mins    Bhavna Davis, PT  [place] License # [id number]  Physical Therapist    Electronically signed by Bhavna Dvais, PT, 22, 3:08 PM EST

## 2022-11-17 ENCOUNTER — TREATMENT (OUTPATIENT)
Dept: PHYSICAL THERAPY | Facility: CLINIC | Age: 67
End: 2022-11-17

## 2022-11-17 DIAGNOSIS — Z96.612 STATUS POST TOTAL SHOULDER ARTHROPLASTY, LEFT: Primary | ICD-10-CM

## 2022-11-17 DIAGNOSIS — R68.89 IMPAIRED FUNCTION OF UPPER EXTREMITY: ICD-10-CM

## 2022-11-17 DIAGNOSIS — M25.512 ACUTE PAIN OF LEFT SHOULDER: ICD-10-CM

## 2022-11-17 PROCEDURE — 97110 THERAPEUTIC EXERCISES: CPT | Performed by: PHYSICAL THERAPIST

## 2022-11-17 PROCEDURE — G0283 ELEC STIM OTHER THAN WOUND: HCPCS | Performed by: PHYSICAL THERAPIST

## 2022-11-17 PROCEDURE — 97140 MANUAL THERAPY 1/> REGIONS: CPT | Performed by: PHYSICAL THERAPIST

## 2022-11-17 NOTE — PROGRESS NOTES
Physical Therapy Daily Treatment Note  [facility] Physical Therapy [facility]  [address], [address]  [place] [address]    Patient: Tati Hanley   : 1955  Referring practitioner: Vickey Hair MD  Today's Date: 2022  Patient seen for 31 sessions    Visit Diagnoses:    ICD-10-CM ICD-9-CM   1. Status post total shoulder arthroplasty, left  Z96.612 V43.61   2. Impaired function of upper extremity  R68.89 V49.5   3. Acute pain of left shoulder  M25.512 719.41       Subjective   Tati Hanley reports: that she is having her on Monday.  Notes that she is a bit sore from her home PT.  Soreness in the biceps.  Soreness in scapular stabilizers as well.  Cervical soreness.  NO radicular symptoms.  Still awakening early morning but is able to get back to sleep.      Objective   Active shoulder flexion 114*, abduction 78    IR to L1    Able to hold at 124* flexion if assisted to that position     See Exercise, Manual, and Modality Logs for complete treatment.     Patient Education: cont HEP    Assessment/Plan  Tati is improving in her motion slowly but steadily.   Still has pinch type pain at end range flexion and abduction but less so with ER.  To have MRI next week    Progress strengthening /stabilization /functional activity  Progress program based on MRI results         Timed:  Manual Therapy:    15     mins  35363;  Therapeutic Exercise:    23/40     mins  82326;     Neuromuscular Alana:    0    mins  37516;    Therapeutic Activity:     0     mins  16005;     Ultrasound:      0     mins  22298;    Iontophoresis              __0_   mins  Dry Needling               _____   mins        Untimed:  Electrical Stimulation:     15    mins  92363 ( );  Mechanical Traction:             mins  59638;   Paraffin                       _____  mins     Timed Treatment:   38   mins   Total Treatment:     60   mins    Bhavna Davis, PT  [place] License # [id number]  Physical  Therapist    Electronically signed by Bhavna Davis, PT, 11/17/22, 3:04 PM EST

## 2022-11-23 ENCOUNTER — TREATMENT (OUTPATIENT)
Dept: PHYSICAL THERAPY | Facility: CLINIC | Age: 67
End: 2022-11-23

## 2022-11-23 DIAGNOSIS — R68.89 IMPAIRED FUNCTION OF UPPER EXTREMITY: ICD-10-CM

## 2022-11-23 DIAGNOSIS — Z96.612 STATUS POST TOTAL SHOULDER ARTHROPLASTY, LEFT: Primary | ICD-10-CM

## 2022-11-23 PROCEDURE — 97140 MANUAL THERAPY 1/> REGIONS: CPT | Performed by: PHYSICAL THERAPIST

## 2022-11-23 PROCEDURE — G0283 ELEC STIM OTHER THAN WOUND: HCPCS | Performed by: PHYSICAL THERAPIST

## 2022-11-23 PROCEDURE — 97110 THERAPEUTIC EXERCISES: CPT | Performed by: PHYSICAL THERAPIST

## 2022-11-23 NOTE — PROGRESS NOTES
Physical Therapy Daily Treatment Note    Visit Diagnoses:    ICD-10-CM ICD-9-CM   1. Status post total shoulder arthroplasty, left  Z96.612 V43.61   2. Impaired function of upper extremity  R68.89 V49.5       VISIT#: 32      Tati Hanley reports: She had her MRI 2 days ago. Her report has not been read yet. She states a couple of her range of motions are not adequate yet. She is concerned since it has been about 6 months and the window to make gains is closing. It is better than what is was when she started PT but not where she would like it to be.   Current Pain Level:    2/10; Worst:   7/10; Best:  2/10  Location Of Pain: anteriorlateral L shoulder, around the incision  Quality of Pain: achy, sharp  Response to Previous Session: Good. No issues    Objective    Increased sets/reps of:  none   Increased resistance on:  none  Added to Program: none    AROM in supine Lshoulder flexion = 130; abd = 75 deg; ER = 32 deg, IR =  40 deg, AROM L shoulder flexion in sitting = 116 deg  Tight and tender in L UT, tender in pecs and anterior, lateral GH joint      See Exercise, Manual, and Modality Logs for complete treatment.     Patient Education: Pt was educated on exercise biomechanical correctness, intensity, and speed.     Assessment:  Pt able to have MRI done but not yet read. Pt with persistent pain and plateau of range of motion progress.  Pt will continue to benefit from skilled PT interventions to address current functional deficits and impairments.       Plan: Progress to/Continue with current program. Waiting MRI       Timed:  Manual Therapy:            15_    mins  74560;  Ultrasound:                     0      mins  62673;   Therapeutic Exercise:    10     mins  25532;     Neuromuscular Alana:   0     mins  83642;    Therapeutic Activity:      0     mins  07633;      Iontophoresis              _0__   mins  Dry Needling               _0____   mins         Untimed:  Work Conditioning: __0__ mins  56491  Electrical Stimulation:   15    mins  91320 ( );  Mechanical Traction:       0        mins  65738;   Paraffin                       __0___  mins   Ice/Heat: __15__ mins      Timed Treatment:   25   mins   Total Treatment:     60   mins          ROSALIND Campbell License # [id number]  Physical Therapist Assistant

## 2022-11-30 ENCOUNTER — TREATMENT (OUTPATIENT)
Dept: PHYSICAL THERAPY | Facility: CLINIC | Age: 67
End: 2022-11-30

## 2022-11-30 DIAGNOSIS — M25.512 ACUTE PAIN OF LEFT SHOULDER: ICD-10-CM

## 2022-11-30 DIAGNOSIS — R68.89 IMPAIRED FUNCTION OF UPPER EXTREMITY: ICD-10-CM

## 2022-11-30 DIAGNOSIS — Z96.612 STATUS POST TOTAL SHOULDER ARTHROPLASTY, LEFT: Primary | ICD-10-CM

## 2022-11-30 PROCEDURE — 97110 THERAPEUTIC EXERCISES: CPT | Performed by: PHYSICAL THERAPIST

## 2022-11-30 PROCEDURE — 97140 MANUAL THERAPY 1/> REGIONS: CPT | Performed by: PHYSICAL THERAPIST

## 2022-11-30 PROCEDURE — G0283 ELEC STIM OTHER THAN WOUND: HCPCS | Performed by: PHYSICAL THERAPIST

## 2022-11-30 PROCEDURE — 97530 THERAPEUTIC ACTIVITIES: CPT | Performed by: PHYSICAL THERAPIST

## 2022-11-30 NOTE — PROGRESS NOTES
Physical Therapy Daily Treatment Note  [facility] Physical Therapy [facility]  [address], [address]  [place] [address]    Patient: Tati Hanley   : 1955  Referring practitioner: Vickey Hair MD  Today's Date: 2022  Patient seen for 33 sessions    Visit Diagnoses:    ICD-10-CM ICD-9-CM   1. Status post total shoulder arthroplasty, left  Z96.612 V43.61   2. Impaired function of upper extremity  R68.89 V49.5   3. Acute pain of left shoulder  M25.512 719.41       Subjective   Tati Hanley reports: that she got her MRI done and was confused about the differences between the findings and the impression.  Is having an addendum done on the read and will be picking those results up today.  Still has pinpoint pain in the biceps tendon and muscle belly.  Has been compliant with therapy as much as pain allows.  States that her pain is constant and varies from a 3-5/10.        Objective   Able to reach to the back of her head with significant effort in sitting    Passive flexion 140* but pinch sensation noted with greater motion than that    See Exercise, Manual, and Modality Logs for complete treatment.     Patient Education: Discussed results of MRI - impression indicates no fracture or other significant pathology    Assessment/Plan  Patient is 5 months post TSA with increased symptoms as what would be expected at this time.  Having considerable irritation in the biceps distribution preventing much active exercise of elbow flexors.  Patient to see Dr Hair next week (22) to discuss MRI and potential treatment path.    Progress strengthening /stabilization /functional activity as symptoms allow           Timed:  Manual Therapy:    15     mins  62618;  Therapeutic Exercise:    20/40     mins  70104;     Neuromuscular Alana:    0    mins  03990;    Therapeutic Activity:     10     mins  41339;     Ultrasound:      0     mins  17146;    Iontophoresis               __0_   mins  Dry Needling               _____   mins        Untimed:  Electrical Stimulation:     15    mins  97131 ( );  Mechanical Traction:             mins  98716;   Paraffin                       _____  mins     Timed Treatment:   45   mins   Total Treatment:     80   mins    Bhavna Davis, PT  [place] License # [id number]  Physical Therapist    Electronically signed by Bhavna Davis, PT, 11/30/22, 10:46 AM EST

## 2022-12-05 ENCOUNTER — TREATMENT (OUTPATIENT)
Dept: PHYSICAL THERAPY | Facility: CLINIC | Age: 67
End: 2022-12-05

## 2022-12-05 DIAGNOSIS — G89.29 CHRONIC LEFT SHOULDER PAIN: ICD-10-CM

## 2022-12-05 DIAGNOSIS — R68.89 IMPAIRED FUNCTION OF UPPER EXTREMITY: ICD-10-CM

## 2022-12-05 DIAGNOSIS — M19.019 SHOULDER ARTHRITIS: ICD-10-CM

## 2022-12-05 DIAGNOSIS — M25.512 CHRONIC LEFT SHOULDER PAIN: ICD-10-CM

## 2022-12-05 DIAGNOSIS — M25.512 ACUTE PAIN OF LEFT SHOULDER: ICD-10-CM

## 2022-12-05 DIAGNOSIS — Z96.612 STATUS POST TOTAL SHOULDER ARTHROPLASTY, LEFT: Primary | ICD-10-CM

## 2022-12-05 PROCEDURE — 97110 THERAPEUTIC EXERCISES: CPT | Performed by: PHYSICAL THERAPIST

## 2022-12-05 PROCEDURE — 97140 MANUAL THERAPY 1/> REGIONS: CPT | Performed by: PHYSICAL THERAPIST

## 2022-12-05 NOTE — PROGRESS NOTES
Physical Therapy Daily Treatment Note      Patient: Tati Hanley   : 1955  Referring practitioner: Vickey Hair MD  Date of Initial Visit: Type: THERAPY  Noted: 2022  Today's Date: 2022  Patient seen for 34 sessions       Visit Diagnoses:    ICD-10-CM ICD-9-CM   1. Status post total shoulder arthroplasty, left  Z96.612 V43.61   2. Impaired function of upper extremity  R68.89 V49.5   3. Acute pain of left shoulder  M25.512 719.41   4. Shoulder arthritis  M19.019 716.91   5. Chronic left shoulder pain  M25.512 719.41    G89.29 338.29       Subjective Evaluation    History of Present Illness    Subjective comment: pt suspecting partial bicep tear based on her pain although hasn't confirmed this with ortho yet. will see Dr. Hair 22 to discuss next steps. doesn't want another surgery and has not had an injection.        Objective   See Exercise, Manual, and Modality Logs for complete treatment.       Assessment & Plan     Assessment    Assessment details: Did well today with light PROM. Added on prone ex with 45 degree T as she is unable to row or do horiz T due to pain and weakness. Also added on T or open book in SL along with SL abd and circles.   Should do better with OH motion as scapular muscles get better.   Also will do more self massage at sub clavicle area which is likely pain response to bicep weakness.     P: see how new ex help. Go slow on IR ex.           Timed:         Manual Therapy:    15     mins  32272;     Therapeutic Exercise:    45     mins  39967;     Neuromuscular Alana:        mins  82005;    Therapeutic Activity:          mins  43468;     Gait Training:           mins  76577;     Ultrasound:          mins  77689;    Ionto                                   mins   59450  Self Care                            mins   86057  Canalith Repos         mins 65027      Un-Timed:  Electrical Stimulation:    15     mins  41605 ( );  Dry Needling          mins  self-pay  Traction          mins 26933      Timed Treatment:   60   mins   Total Treatment:     75   mins    Zorre Zeno Kimura, PT  [place] License: [id number]    [place] License:  [id number]

## 2022-12-07 NOTE — PROGRESS NOTES
RM:________    Referral Provider: No ref. provider found Eloisa Epperson MD    NEW PATIENT/ CONSULT  PREVIOUS CARDIOLOGIST:   CARDIAC TESTING:     : 1955   AGE: 67 y.o.    2023  REASON FOR VISIT/  CC:      WT: ____________ BP: __________L __________R/ HR_______________    CHEST PAIN: _____________    SOA: ____________PALPS: __________      LIGHTHEADED: ___________ FATIGUE: _______________ EDEMA______________  ALLERGIES:  Amoxicillin, Codeine, Erythromycin, Nsaids, and Meloxicam  SMOKING HISTORY          CHILDREN: _______________________       CAFFEINE USE________  ALCOHOL _____________  OCCUPATION_____________  No past surgical history on file.             FAMILY HISTORY  HEART DISEASE  CHF  DIABETES  CARDIAC ARREST  STROKE  CANCER  ANEURYSM                                                             H/O: MI_____   STROKE________   GOUT_____   ANEMIA______     CAROTID________ HIV____ CAD_______ HYPERCHOL _____    H/O: CHF _____   RF____ DM___ HTN_______PVD____THYROID DISEASE_______    PMH: GI ____   HEPATITIS ___ KIDNEY DISEASE ___ LUNG DISEASE _______     SLEEP APNEA ____ BLOOD CLOTS ____ DVT ____ VEIN STRIPPING ___________     CANCER _________________________________ CHEMO/ RADIATION__________

## 2022-12-08 ENCOUNTER — TREATMENT (OUTPATIENT)
Dept: PHYSICAL THERAPY | Facility: CLINIC | Age: 67
End: 2022-12-08

## 2022-12-08 DIAGNOSIS — Z96.612 STATUS POST TOTAL SHOULDER ARTHROPLASTY, LEFT: Primary | ICD-10-CM

## 2022-12-08 DIAGNOSIS — M25.512 ACUTE PAIN OF LEFT SHOULDER: ICD-10-CM

## 2022-12-08 DIAGNOSIS — R68.89 IMPAIRED FUNCTION OF UPPER EXTREMITY: ICD-10-CM

## 2022-12-08 PROCEDURE — 97110 THERAPEUTIC EXERCISES: CPT | Performed by: PHYSICAL THERAPIST

## 2022-12-08 PROCEDURE — 97140 MANUAL THERAPY 1/> REGIONS: CPT | Performed by: PHYSICAL THERAPIST

## 2022-12-08 PROCEDURE — 97530 THERAPEUTIC ACTIVITIES: CPT | Performed by: PHYSICAL THERAPIST

## 2022-12-08 PROCEDURE — G0283 ELEC STIM OTHER THAN WOUND: HCPCS | Performed by: PHYSICAL THERAPIST

## 2022-12-08 NOTE — PROGRESS NOTES
MD Letter - Reassessment  [facility] Physical Therapy - [facility]  [address], [address]  [place] [address]  Patient: Tati Hanley   : 1955  Vickey Hair MD  Date of Initial Visit: Type: THERAPY  Noted: 2022  Today's Date: 2022  Patient seen for 35 sessions    Treatment has included: therapeutic exercise, neuromuscular re-education, manual therapy, therapeutic activity, electrical stimulation and cryotherapy    Subjective   Tati states her shoulder pain is still constant and varies from a 3-6/10.  It has localized to the anterior shoulder and biceps region.  She still has some tightness in the upper traps region but notes that she has chronic tightness.  When she attempts to elevated the shoulder she feels a pinching type pain in the anterior shoulder.  When she attempts to abduct the shoulder she feels as though something is stopping her in the anterior lateral shoulder and has a sense of weakness.      Objective she presents with normal arm swing with her gait  Shoulder AROM - Flexion 113/148*, Abduction 84*/140*,  ER 46/56*,  IR 70/82*  Strength - Flexion 4/5,  Extension 4+/5,  Abduction 3+/5,   ER 3+/5,  IR 3+/5  Sensation - intact  Palpation - tenderness in the anterior shoulder and all along the biceps tendon and muscle belly  Special tests - no instability noted in the shoulder   Activity tolerances - she is able to use her hand to cut her food and do light housework activities with the arm with minimal to no discomfort.  She is able to wash her hair with both hands but really struggles to reach the back of her head.  She is unable to fix her hair with the left hand.  She still has difficulty pulling her pants up with the left hand.  She is able to reach into her back pocket but is unable to fasten her bra with the left hand.  She is still having some pain interrupting her sleep.    Assessment/Plan  Patient has demonstrated moderate  improvement since  the initiation of therapy but minimal improvement in the past few weeks.  We have backed off of some of her exercises as she was symptomatic.  The pain has decreased.  The motion has increased but only slightly.  The activity tolerances have increased slightly.  I feel that the patient would benefit from continued therapy.  If you have any questions concerning the care, please do not hesitate to contact me.          PT Signature: Bhavna Davis, PT  PT License #[id number]    Electronically signed by Bhavna Davis, PT, 12/08/22, 2:34 PM EST    Manual Therapy:    15     mins  04040;  Therapeutic Exercise:    10/30     mins  48935;     Neuromuscular Alana:    0    mins  40045;    Therapeutic Activity:     15     mins  80168;    Ultrasound                     0 _  mins 74364  Iontophoresis              ______  Electrical Stimulation  ___15___mins 72854    Timed Treatment:   40   mins   Total Treatment:     80   mins

## 2022-12-12 ENCOUNTER — TREATMENT (OUTPATIENT)
Dept: PHYSICAL THERAPY | Facility: CLINIC | Age: 67
End: 2022-12-12

## 2022-12-12 DIAGNOSIS — R68.89 IMPAIRED FUNCTION OF UPPER EXTREMITY: ICD-10-CM

## 2022-12-12 DIAGNOSIS — M25.512 ACUTE PAIN OF LEFT SHOULDER: ICD-10-CM

## 2022-12-12 DIAGNOSIS — Z96.612 STATUS POST TOTAL SHOULDER ARTHROPLASTY, LEFT: Primary | ICD-10-CM

## 2022-12-12 PROCEDURE — 97530 THERAPEUTIC ACTIVITIES: CPT | Performed by: PHYSICAL THERAPIST

## 2022-12-12 PROCEDURE — 97535 SELF CARE MNGMENT TRAINING: CPT | Performed by: PHYSICAL THERAPIST

## 2022-12-12 NOTE — PROGRESS NOTES
Physical Therapy Daily Treatment Note  [facility] Physical Therapy - [facility]  [address], [address]  [place] [address]    Patient: Tati Hanley   : 1955  Referring practitioner: Vickey Hair MD  Today's Date: 2022  Patient seen for 36 sessions    Visit Diagnoses:    ICD-10-CM ICD-9-CM   1. Status post total shoulder arthroplasty, left  Z96.612 V43.61   2. Impaired function of upper extremity  R68.89 V49.5   3. Acute pain of left shoulder  M25.512 719.41       Subjective   Tati Hanley reports: that she saw Dr Hair last week.  He reviewed the MRI and did not see anything of concern.  He did check her range and felt like she had more pain than she should at this point post op.  He wants to do another open procedure to biopsy the shoulder to check for any infection.  He may need to do a reverse TSR.       Objective     See Objective findings from 22 note    Patient Education/Therapeutic activities - 40 minutes conversation with patient concerning :shoulder condition, other possible interventions versus no additional intervention.  Review of anatomy with atlas as well as with 3 day model and computer images of TSR & RTSA.  Discussed the need to continue her exercises to prevent loss of motion/funciton in the interim.     Assessment/Plan  Patient saw Dr Hair and he is concerned about her shoulder in its current condition.  May need to have additional procedure done with possible change to RTSA.  Patient with many questions/concerns at this time.  She is going to stop formal therapy until decision is made as to how to proceed.  She will contact me to let me know her decision.    DC formal therapy at this time.           Timed:  Manual Therapy:    0     mins  28651;  Therapeutic Exercise:    0     mins  31706;     Neuromuscular Alana:    0    mins  13132;    Therapeutic Activity:     30     mins  29838;     Ultrasound:      0     mins  81465;    Patient  education        __10_   mins 39191  Dry Needling               _____   mins        Untimed:  Electrical Stimulation:         mins  41564 ( );  Mechanical Traction:             mins  82138;   Paraffin                       _____  mins     Timed Treatment:   40   mins   Total Treatment:     40   mins    Bhavna Davis PT  [place] License # [id number]  Physical Therapist    Electronically signed by Bhavna Davis, PT, 12/12/22, 2:34 PM EST       laceration/Other specify

## 2023-01-04 ENCOUNTER — OFFICE VISIT (OUTPATIENT)
Dept: CARDIOLOGY | Facility: CLINIC | Age: 68
End: 2023-01-04
Payer: COMMERCIAL

## 2023-01-04 VITALS
WEIGHT: 120.6 LBS | SYSTOLIC BLOOD PRESSURE: 122 MMHG | HEIGHT: 64 IN | DIASTOLIC BLOOD PRESSURE: 70 MMHG | BODY MASS INDEX: 20.59 KG/M2 | HEART RATE: 65 BPM

## 2023-01-04 DIAGNOSIS — H53.9 VISUAL CHANGES: ICD-10-CM

## 2023-01-04 DIAGNOSIS — R47.01 EXPRESSIVE APHASIA: Primary | ICD-10-CM

## 2023-01-04 DIAGNOSIS — E78.2 MIXED HYPERLIPIDEMIA: ICD-10-CM

## 2023-01-04 DIAGNOSIS — R55 NEAR SYNCOPE: ICD-10-CM

## 2023-01-04 DIAGNOSIS — Q21.12 PFO (PATENT FORAMEN OVALE): ICD-10-CM

## 2023-01-04 DIAGNOSIS — I49.3 PVC (PREMATURE VENTRICULAR CONTRACTION): ICD-10-CM

## 2023-01-04 PROCEDURE — 93000 ELECTROCARDIOGRAM COMPLETE: CPT | Performed by: INTERNAL MEDICINE

## 2023-01-04 PROCEDURE — 99204 OFFICE O/P NEW MOD 45 MIN: CPT | Performed by: INTERNAL MEDICINE

## 2023-01-04 RX ORDER — TIZANIDINE 2 MG/1
2 TABLET ORAL AS NEEDED
COMMUNITY
Start: 2022-11-16

## 2023-01-04 NOTE — PROGRESS NOTES
Date of Office Visit: 23  Encounter Provider: Agusto Greene MD  Place of Service: [facility] CARDIOLOGY  Patient Name: Tati Hanley  :1955    Chief Complaint   Patient presents with   • Loss of Consciousness   :     HPI:     Ms. Hanley is 67 y.o. and presents today in consultation for an abnormal echo at the request of Dr Epperson.    She is a very healthy woman who looks younger than her stated age.  She was evaluated by cardiologist at [place] in  for PVCs.  She reportedly had an echocardiogram at that time that was unremarkable.    She has a history of migraine with aura.  Interestingly, her migraine headaches are usually not that bad, and her auras are visual.  In August, she was at the grocery store with her daughter.  She was in her usual state of health and feeling well.  Her daughter asked her to  some carrots and all of a sudden, the patient suffered altered comprehension and could not understand the test that she was being given.  She could not remember what parents were.  Her vision was cutting in and a tunnel vision type of manner, and it felt like it was accompanying her heartbeat.  She felt very lightheaded and felt like she was going to pass out and she had to sit down.  She could not speak during the episode.  The whole thing lasted approximately 20 minutes and when it resolved, she was back to normal.  She was sent for an outpatient MRI which was reportedly normal although I cannot see the report or the images.  She was sent for a carotid duplex which showed some plaque without stenosis.  She was sent for an echocardiogram which was performed at [place].  It was unremarkable except for what it called a PFO.  He does not tell me if it was small, medium, or large.    Since then, she has felt well and she has had no limitations or further events.    She has a history of elevated LDL but her HDL is also elevated.  She is not diabetic or  hypertensive.  She is postmenopausal and uses topical estrogen and oral progesterone.  Her brother had a stroke at the age of 50 but he was extremely unhealthy and had numerous significant risk factors for cardiovascular disease.    Past Medical History:   Diagnosis Date   • Injury of neck    • Malignant melanoma (HCC)     1986, thigh, fully excised   • Osteoarthritis    • Osteopenia        Past Surgical History:   Procedure Laterality Date   • FOOT SURGERY     • GALLBLADDER SURGERY     • SHOULDER SURGERY Left    • UTERINE FIBROID SURGERY         Social History     Socioeconomic History   • Marital status:    • Number of children: 1   Tobacco Use   • Smoking status: Never   Vaping Use   • Vaping Use: Never used   Substance and Sexual Activity   • Alcohol use: Not Currently     Comment: A glass of wine about once a month, if that.   • Drug use: Never   • Sexual activity: Yes     Partners: Male     Birth control/protection: Post-menopausal     Comment: Menopause age 57       Family History   Problem Relation Age of Onset   • Hypertension Mother    • Hyperlipidemia Maternal Grandfather    • Hypertension Maternal Grandmother    • Heart attack Paternal Grandfather    • Hypertension Brother        Review of Systems   Cardiovascular:        As per HPI   All other systems reviewed and are negative.      Allergies   Allergen Reactions   • Amoxicillin Hives   • Codeine Hives   • Erythromycin Nausea And Vomiting and Other (See Comments)     Headaches  Headache     • Nsaids Other (See Comments)     Hallucinations with volteran (meloxiam, celebrex and volteran)    • Meloxicam Other (See Comments)     Very tired         Current Outpatient Medications:   •  ESTROGEL 0.75 MG/1.25 GM (0.06%) topical gel, APPLY 2 PUMPS DAILY TO AFFECTED AREA, Disp: , Rfl:   •  progesterone (PROMETRIUM) 100 MG capsule, Take 100 mg by mouth Daily., Disp: , Rfl:   •  sertraline (ZOLOFT) 25 MG tablet, Take  by mouth Daily., Disp: , Rfl:   •   tiZANidine (ZANAFLEX) 2 MG tablet, Take 2 mg by mouth As Needed., Disp: , Rfl:       Objective:     Vitals:    01/04/23 1321   BP: 122/70   BP Location: Right arm   Pulse: 65   Weight: 54.7 kg (120 lb 9.6 oz)   Height: 161.3 cm (63.5\")     Body mass index is 21.03 kg/m².    Vitals reviewed.   Constitutional:       Appearance: Healthy appearance. Well-developed and not in distress.   Eyes:      Conjunctiva/sclera: Conjunctivae normal.   HENT:      Head: Normocephalic.      Nose: Nose normal.   Neck:      Vascular: No JVD. JVD normal.      Lymphadenopathy: No cervical adenopathy.   Pulmonary:      Effort: Pulmonary effort is normal.      Breath sounds: Normal breath sounds.   Cardiovascular:      Normal rate. Regular rhythm.      Murmurs: There is no murmur.   Pulses:     Intact distal pulses.   Edema:     Peripheral edema absent.   Abdominal:      Palpations: Abdomen is soft.      Tenderness: There is no abdominal tenderness.   Musculoskeletal: Normal range of motion.      Cervical back: Normal range of motion. Skin:     General: Skin is warm and dry.      Findings: No rash.   Neurological:      General: No focal deficit present.      Mental Status: Alert and oriented to person, place, and time.      Cranial Nerves: No cranial nerve deficit.   Psychiatric:         Behavior: Behavior normal.         Thought Content: Thought content normal.         Judgment: Judgment normal.           ECG 12 Lead    Date/Time: 1/4/2023 4:25 PM  Performed by: Agusto Greene MD  Authorized by: Agusto Greene MD   Comparison: compared with previous ECG   Comparison to previous ECG: PVCs are new c/w prior  Rhythm: sinus rhythm  Ectopy: unifocal PVCs  Conduction: conduction normal  ST Segments: ST segments normal  T Waves: T waves normal  QRS axis: normal  Other: no other findings    Clinical impression: non-specific ECG              Assessment:       Diagnosis Plan   1. Expressive aphasia        2. Visual changes        3. Near syncope   Holter Monitor - 72 Hour Up To 15 Days      4. PVC (premature ventricular contraction)  Holter Monitor - 72 Hour Up To 15 Days      5. PFO (patent foramen ovale)  Holter Monitor - 72 Hour Up To 15 Days      6. Mixed hyperlipidemia             Plan:       Ms Hanley suffered an episode in August that is concerning.  It certainly has neurologic and cardiac symptomatology to it.  She had altered comprehension and aphasia.  She was very lightheaded and her vision appeared to be cutting in and out along with her heartbeat.  She did have an extremely mild headache afterwards.  She does have a history of migraine with aura.    The concern is that she has an echo from an outside incision that reports a PFO, and she is on hormone replacement therapy (although topical estrogen should be lower risk than oral estrogen, it does still increased risk to some degree).  She does not have any signs or symptoms of lower extremity DVT and I feel that the duplex would be of low yield.  I am going to get a copy of her echocardiogram on a disc so I can personally review the images and assess the amount of shunting.  I do think she would benefit from a referral to neurology to help figure out if they thought this was more of a migraine versus TIA, and to see if they would want to personally review the MRI images.    I did go ahead and place a rhythm monitor on her today.  She does have some PVCs on her EKG but she says that these have been present for many years and they are generally asymptomatic.  If the monitor is normal, I think she would benefit from having a Kardia device so that if she ever does suffer another episode, we could get a rhythm tracing.    We also discussed her lipid risk.  Using the ACC AHA risk calculator, her 10-year risk is 5% which is quite low.  We did talk about how risk is parabolic and that it will steadily increase over time.  At this point, I feel that she is low risk enough that we can follow her and  that she does not necessarily need a calcium score for restratification.    Sincerely,       Agusto Greene MD

## 2023-01-19 DIAGNOSIS — Z96.612 S/P REVERSE TOTAL SHOULDER ARTHROPLASTY, LEFT: Primary | ICD-10-CM

## 2023-01-19 RX ORDER — HYDROCODONE BITARTRATE AND ACETAMINOPHEN 5; 325 MG/1; MG/1
1 TABLET ORAL EVERY 4 HOURS PRN
Qty: 30 TABLET | Refills: 0 | Status: SHIPPED | OUTPATIENT
Start: 2023-01-19

## 2023-01-25 LAB
MAXIMAL PREDICTED HEART RATE: 155 BPM
STRESS TARGET HR: 132 BPM

## 2023-01-25 NOTE — PROGRESS NOTES
Please let her know this is normal. Awaiting neuro input (I know that appt is far away, unfortunately).    Jayson gold

## 2023-02-02 ENCOUNTER — TREATMENT (OUTPATIENT)
Dept: PHYSICAL THERAPY | Facility: CLINIC | Age: 68
End: 2023-02-02
Payer: COMMERCIAL

## 2023-02-02 DIAGNOSIS — M25.512 ACUTE PAIN OF LEFT SHOULDER: ICD-10-CM

## 2023-02-02 DIAGNOSIS — R68.89 IMPAIRED FUNCTION OF UPPER EXTREMITY: ICD-10-CM

## 2023-02-02 DIAGNOSIS — Z96.612 STATUS POST TOTAL SHOULDER ARTHROPLASTY, LEFT: Primary | ICD-10-CM

## 2023-02-02 PROCEDURE — 97162 PT EVAL MOD COMPLEX 30 MIN: CPT | Performed by: PHYSICAL THERAPIST

## 2023-02-02 PROCEDURE — 97530 THERAPEUTIC ACTIVITIES: CPT | Performed by: PHYSICAL THERAPIST

## 2023-02-02 NOTE — PROGRESS NOTES
Physical Therapy Re Evaluation and Plan of Care  [facility]  [address] [address], [address]  [place] [address]    Patient: Tati Hanley   : 1955  Diagnosis/ICD-10 Code:  Status post total shoulder arthroplasty, left [Z96.612]  Referring practitioner: Vickey Hair MD  Today's Date: 2023    Subjective Evaluation    History of Present Illness    Subjective comment: Since last here she has been doing her HEP to attempt to increase her motion and strength.  Is scheduled to have a left shoulder arthrocopy for exploration and potentioal release of adhesions. Previously had been scheduled in 2022 but that never did occur. Is doing her exercises every other day.  Patient Occupation: Psychologist - televisits  Pain  Current pain ratin  At best pain ratin  At worst pain ratin  Location: Anterior shoulder, lateral shoulder feels tight, intermittent posterior shoulder pain, occasional biceps pain, cervical pain has increased recently  Quality: knife-like, sharp, tight and discomfort  Relieving factors: medications, rest, heat and change in position  Aggravating factors: outstretched reach, overhead activity and lifting (opening car door)  Progression: no change    Hand dominance: right    Treatments  Previous treatment: physical therapy and medication  Patient Goals  Patient goal: Compare status now versus previous status at end of therapy            Objective          Postural Observations  Seated posture: fair  Standing posture: fair    Additional Postural Observation Details  Slight protraction of shoulders, some atrophy of shoulder mm, well healed incision    Palpation   Left   Hypertonic in the upper trapezius.   Tenderness of the anterior deltoid, biceps, supraspinatus and upper trapezius.     Tenderness     Left Shoulder   Tenderness in the biceps tendon (proximal), bicipital groove and subscapularis tendon.     Cervical/Thoracic  Screen   Cervical range of motion within normal limits with the following exceptions: Increased tone in left>right cervical PVM and upper traps    Neurological Testing     Sensation     Shoulder   Left Shoulder   Intact: light touch    Active Range of Motion   Left Shoulder   Flexion: 82 degrees with pain  Abduction: 76 degrees with pain  External rotation 45°: 35 degrees   Internal rotation 45°: 65 degrees     Passive Range of Motion   Left Shoulder   Flexion: 148 degrees   Abduction: 154 degrees   External rotation 45°: 45 degrees   Internal rotation 45°: 75 degrees     Strength/Myotome Testing     Left Shoulder     Planes of Motion   Flexion: 3+   Extension: 4+   Abduction: 4-   External rotation at 0°: 4-   Internal rotation at 0°: 4+     Tests     Left Shoulder   Positive empty can, painful arc and Speed's.   Negative drop arm.           Assessment/Plan  Patient has demonstrated little  improvement since the cessation of therapy.  The pain has  Decreased slightly but has remained limiting to her activity.  The active motion in elevation has actually decreased but the passive motion has increased a bit.  The activity tolerances have increased only slightly.Tati plans to have the arthroscopy done for diagnostic purposes and will consider further intervention if needed based on the results of the arthroscopy.      Therapeutic Activity:     25     mins  [id number];  Lengthy discussion of current HEP, ways to break it up, modify it for pain, discussed possible post op course     Evaluation Time:     25  mins  Timed Treatment:   25   mins   Total Treatment:     55   mins    PT: Bhavna Davis PT     License Number: [place] [id number]  Electronically signed by Bhavna Davis PT, 02/02/23, 4:11 PM EST    Certification Period: 2/2/2023 thru 5/2/2023  I certify that the therapy services are furnished while this patient is under my care.  The services outlined above are required by this patient, and will be reviewed every 90  days.         Physician Signature:__________________________________________________    PHYSICIAN: Vickey Hair MD      DATE:     Please sign and return via fax to [email or web address] . Thank you, [facility].    PT SIGNATURE: Bhavna Davis, PT   [place] LICENSE:  [id number]

## 2023-02-10 ENCOUNTER — HOSPITAL ENCOUNTER (OUTPATIENT)
Dept: GENERAL RADIOLOGY | Facility: HOSPITAL | Age: 68
Discharge: HOME | End: 2023-02-10
Payer: COMMERCIAL

## 2023-02-10 ENCOUNTER — PRE-ADMISSION TESTING (OUTPATIENT)
Dept: PREADMISSION TESTING | Facility: HOSPITAL | Age: 68
End: 2023-02-10
Payer: COMMERCIAL

## 2023-02-10 VITALS
RESPIRATION RATE: 16 BRPM | TEMPERATURE: 97.6 F | HEIGHT: 64 IN | OXYGEN SATURATION: 97 % | BODY MASS INDEX: 19.63 KG/M2 | WEIGHT: 115 LBS | HEART RATE: 59 BPM | DIASTOLIC BLOOD PRESSURE: 88 MMHG | SYSTOLIC BLOOD PRESSURE: 131 MMHG

## 2023-02-10 LAB
ALBUMIN SERPL-MCNC: 4.8 G/DL (ref 3.5–5.2)
ALBUMIN/GLOB SERPL: 2.3 G/DL
ALP SERPL-CCNC: 60 U/L (ref 39–117)
ALT SERPL W P-5'-P-CCNC: 16 U/L (ref 1–33)
ANION GAP SERPL CALCULATED.3IONS-SCNC: 11 MMOL/L (ref 5–15)
AST SERPL-CCNC: 22 U/L (ref 1–32)
BASOPHILS # BLD AUTO: 0.05 10*3/MM3 (ref 0–0.2)
BASOPHILS NFR BLD AUTO: 0.7 % (ref 0–1.5)
BILIRUB SERPL-MCNC: 0.6 MG/DL (ref 0–1.2)
BUN SERPL-MCNC: 14 MG/DL (ref 8–23)
BUN/CREAT SERPL: 17.5 (ref 7–25)
CALCIUM SPEC-SCNC: 9.6 MG/DL (ref 8.6–10.5)
CHLORIDE SERPL-SCNC: 102 MMOL/L (ref 98–107)
CO2 SERPL-SCNC: 26 MMOL/L (ref 22–29)
CREAT SERPL-MCNC: 0.8 MG/DL (ref 0.57–1)
DEPRECATED RDW RBC AUTO: 40.6 FL (ref 37–54)
EGFRCR SERPLBLD CKD-EPI 2021: 81.9 ML/MIN/1.73
EOSINOPHIL # BLD AUTO: 0.31 10*3/MM3 (ref 0–0.4)
EOSINOPHIL NFR BLD AUTO: 4.5 % (ref 0.3–6.2)
ERYTHROCYTE [DISTWIDTH] IN BLOOD BY AUTOMATED COUNT: 12.7 % (ref 12.3–15.4)
GLOBULIN UR ELPH-MCNC: 2.1 GM/DL
GLUCOSE SERPL-MCNC: 89 MG/DL (ref 65–99)
HCT VFR BLD AUTO: 44 % (ref 34–46.6)
HGB BLD-MCNC: 14.5 G/DL (ref 12–15.9)
IMM GRANULOCYTES # BLD AUTO: 0.02 10*3/MM3 (ref 0–0.05)
IMM GRANULOCYTES NFR BLD AUTO: 0.3 % (ref 0–0.5)
LYMPHOCYTES # BLD AUTO: 2.35 10*3/MM3 (ref 0.7–3.1)
LYMPHOCYTES NFR BLD AUTO: 34.4 % (ref 19.6–45.3)
MCH RBC QN AUTO: 28.9 PG (ref 26.6–33)
MCHC RBC AUTO-ENTMCNC: 33 G/DL (ref 31.5–35.7)
MCV RBC AUTO: 87.8 FL (ref 79–97)
MONOCYTES # BLD AUTO: 0.51 10*3/MM3 (ref 0.1–0.9)
MONOCYTES NFR BLD AUTO: 7.5 % (ref 5–12)
NEUTROPHILS NFR BLD AUTO: 3.6 10*3/MM3 (ref 1.7–7)
NEUTROPHILS NFR BLD AUTO: 52.6 % (ref 42.7–76)
NRBC BLD AUTO-RTO: 0 /100 WBC (ref 0–0.2)
PLATELET # BLD AUTO: 282 10*3/MM3 (ref 140–450)
PMV BLD AUTO: 10.2 FL (ref 6–12)
POTASSIUM SERPL-SCNC: 4.2 MMOL/L (ref 3.5–5.2)
PROT SERPL-MCNC: 6.9 G/DL (ref 6–8.5)
RBC # BLD AUTO: 5.01 10*6/MM3 (ref 3.77–5.28)
SODIUM SERPL-SCNC: 139 MMOL/L (ref 136–145)
WBC NRBC COR # BLD: 6.84 10*3/MM3 (ref 3.4–10.8)

## 2023-02-10 PROCEDURE — 36415 COLL VENOUS BLD VENIPUNCTURE: CPT

## 2023-02-10 PROCEDURE — 80053 COMPREHEN METABOLIC PANEL: CPT

## 2023-02-10 PROCEDURE — 85025 COMPLETE CBC W/AUTO DIFF WBC: CPT

## 2023-02-10 PROCEDURE — 71046 X-RAY EXAM CHEST 2 VIEWS: CPT

## 2023-02-10 NOTE — DISCHARGE INSTRUCTIONS
Take the following medications the morning of surgery:    ZOLOFT    If you are on prescription narcotic pain medication to control your pain you may also take that medication the morning of surgery.    General Instructions:  Do not eat solid food after midnight the night before surgery.  You may drink clear liquids day of surgery but must stop at least one hour before your hospital arrival time.  It is beneficial for you to have a clear drink that contains carbohydrates the day of surgery.  We suggest a 12 to 20 ounce bottle of Gatorade or Powerade for non-diabetic patients    Clear liquids are liquids you can see through.  Nothing red in color.     Plain water                               Sports drinks  Sodas                                   Gelatin (Jell-O)  Fruit juices without pulp such as white grape juice and apple juice  Popsicles that contain no fruit or yogurt  Tea or coffee (no cream or milk added)  Gatorade / Powerade  G2 / Powerade Zero      Bring any papers given to you in the doctor’s office.  Wear clean comfortable clothes.  Do not wear contact lenses, false eyelashes or make-up.  Bring a case for your glasses.  Remove all piercings.  Leave jewelry and any other valuables at home.  Hair extensions with metal clips must be removed prior to surgery.  The Pre-Admission Testing nurse will instruct you to bring medications if unable to obtain an accurate list in Pre-Admission Testing.    REPORT TO SURGERY ENTRANCE ON 2-          Preventing a Surgical Site Infection:  For 2 to 3 days before surgery, avoid shaving with a razor because the razor can irritate skin and make it easier to develop an infection.    Any areas of open skin can increase the risk of a post-operative wound infection by allowing bacteria to enter and travel throughout the body.  Notify your surgeon if you have any skin wounds / rashes even if it is not near the expected surgical site.  The area will need assessed to determine if  surgery should be delayed until it is healed.  The night prior to surgery shower using a fresh bar of anti-bacterial soap (such as Dial) and clean washcloth.  Sleep in a clean bed with clean clothing.  Do not allow pets to sleep with you.  Shower on the morning of surgery using a fresh bar of anti-bacterial soap (such as Dial) and clean washcloth.  Dry with a clean towel and dress in clean clothing.  Ask your surgeon if you will be receiving antibiotics prior to surgery.  Make sure you, your family, and all healthcare providers clean their hands with soap and water or an alcohol based hand  before caring for you or your wound.    Day of surgery:  Your arrival time is approximately two hours before your scheduled surgery time.  Upon arrival, a Pre-op nurse and Anesthesiologist will review your health history, obtain vital signs, and answer questions you may have.  The only belongings needed at this time will be a list of your home medications and if applicable your C-PAP/BI-PAP machine.  A Pre-op nurse will start an IV and you may receive medication in preparation for surgery, including something to help you relax.     Please be aware that surgery does come with discomfort.  We want to make every effort to control your discomfort so please discuss any uncontrolled symptoms with your nurse.   Your doctor will most likely have prescribed pain medications.      If you are going home after surgery you will receive individualized written care instructions before being discharged.  A responsible adult must drive you to and from the hospital on the day of your surgery and stay with you for 24 hours.  Discharge prescriptions can be filled by the hospital pharmacy during regular pharmacy hours.  If you are having surgery late in the day/evening your prescription may be e-prescribed to your pharmacy.  Please verify your pharmacy hours or chose a 24 hour pharmacy to avoid not having access to your prescription because  your pharmacy has closed for the day.      If you have any questions please call Pre-Admission Testing at [phone].  Deductibles and co-payments are collected on the day of service. Please be prepared to pay the required co-pay, deductible or deposit on the day of service as defined by your plan.    Call your surgeon immediately if you experience any of the following symptoms:  Sore Throat  Shortness of Breath or difficulty breathing  Cough  Chills  Body soreness or muscle pain  Headache  Fever  New loss of taste or smell  Do not arrive for your surgery ill.  Your procedure will need to be rescheduled to another time.  You will need to call your physician before the day of surgery to avoid any unnecessary exposure to hospital staff as well as other patients.   ZOLOFT

## 2023-02-16 ENCOUNTER — HOSPITAL ENCOUNTER (OUTPATIENT)
Facility: HOSPITAL | Age: 68
Setting detail: HOSPITAL OUTPATIENT SURGERY
Discharge: HOME | End: 2023-02-16
Attending: ORTHOPAEDIC SURGERY | Admitting: ORTHOPAEDIC SURGERY
Payer: COMMERCIAL

## 2023-02-16 ENCOUNTER — ANESTHESIA EVENT (OUTPATIENT)
Dept: PERIOP | Facility: HOSPITAL | Age: 68
End: 2023-02-16
Payer: COMMERCIAL

## 2023-02-16 ENCOUNTER — ANESTHESIA (OUTPATIENT)
Dept: PERIOP | Facility: HOSPITAL | Age: 68
End: 2023-02-16
Payer: COMMERCIAL

## 2023-02-16 VITALS
HEART RATE: 87 BPM | OXYGEN SATURATION: 97 % | RESPIRATION RATE: 16 BRPM | SYSTOLIC BLOOD PRESSURE: 154 MMHG | TEMPERATURE: 97.7 F | DIASTOLIC BLOOD PRESSURE: 74 MMHG

## 2023-02-16 DIAGNOSIS — T84.7XXA HARDWARE COMPLICATING WOUND INFECTION: ICD-10-CM

## 2023-02-16 PROCEDURE — 87070 CULTURE OTHR SPECIMN AEROBIC: CPT | Performed by: ORTHOPAEDIC SURGERY

## 2023-02-16 PROCEDURE — 25010000002 ROPIVACAINE PER 1 MG: Performed by: ANESTHESIOLOGY

## 2023-02-16 PROCEDURE — 25010000002 MIDAZOLAM PER 1 MG: Performed by: ANESTHESIOLOGY

## 2023-02-16 PROCEDURE — 25010000002 ONDANSETRON PER 1 MG: Performed by: ANESTHESIOLOGY

## 2023-02-16 PROCEDURE — 87176 TISSUE HOMOGENIZATION CULTR: CPT | Performed by: ORTHOPAEDIC SURGERY

## 2023-02-16 PROCEDURE — 25010000002 EPINEPHRINE PER 0.1 MG: Performed by: ORTHOPAEDIC SURGERY

## 2023-02-16 PROCEDURE — 87205 SMEAR GRAM STAIN: CPT | Performed by: ORTHOPAEDIC SURGERY

## 2023-02-16 PROCEDURE — 87102 FUNGUS ISOLATION CULTURE: CPT | Performed by: ORTHOPAEDIC SURGERY

## 2023-02-16 PROCEDURE — 25010000002 FENTANYL CITRATE (PF) 50 MCG/ML SOLUTION: Performed by: ANESTHESIOLOGY

## 2023-02-16 PROCEDURE — 25010000002 PROPOFOL 10 MG/ML EMULSION: Performed by: ANESTHESIOLOGY

## 2023-02-16 PROCEDURE — 25010000002 DEXAMETHASONE PER 1 MG: Performed by: ANESTHESIOLOGY

## 2023-02-16 PROCEDURE — 87075 CULTR BACTERIA EXCEPT BLOOD: CPT | Performed by: ORTHOPAEDIC SURGERY

## 2023-02-16 PROCEDURE — 63710000001 PROMETHAZINE PER 25 MG: Performed by: ANESTHESIOLOGY

## 2023-02-16 PROCEDURE — 25010000002 HYDROMORPHONE PER 4 MG: Performed by: ANESTHESIOLOGY

## 2023-02-16 PROCEDURE — 25010000002 VANCOMYCIN PER 500 MG: Performed by: ORTHOPAEDIC SURGERY

## 2023-02-16 PROCEDURE — 25010000002 PHENYLEPHRINE 10 MG/ML SOLUTION: Performed by: ANESTHESIOLOGY

## 2023-02-16 RX ORDER — DIPHENHYDRAMINE HYDROCHLORIDE 50 MG/ML
12.5 INJECTION INTRAMUSCULAR; INTRAVENOUS
Status: DISCONTINUED | OUTPATIENT
Start: 2023-02-16 | End: 2023-02-16 | Stop reason: HOSPADM

## 2023-02-16 RX ORDER — OXYCODONE AND ACETAMINOPHEN 7.5; 325 MG/1; MG/1
1 TABLET ORAL EVERY 4 HOURS PRN
Status: DISCONTINUED | OUTPATIENT
Start: 2023-02-16 | End: 2023-02-16 | Stop reason: HOSPADM

## 2023-02-16 RX ORDER — LABETALOL HYDROCHLORIDE 5 MG/ML
5 INJECTION, SOLUTION INTRAVENOUS
Status: DISCONTINUED | OUTPATIENT
Start: 2023-02-16 | End: 2023-02-16 | Stop reason: HOSPADM

## 2023-02-16 RX ORDER — NALOXONE HCL 0.4 MG/ML
0.2 VIAL (ML) INJECTION AS NEEDED
Status: DISCONTINUED | OUTPATIENT
Start: 2023-02-16 | End: 2023-02-16 | Stop reason: HOSPADM

## 2023-02-16 RX ORDER — SODIUM CHLORIDE, SODIUM LACTATE, POTASSIUM CHLORIDE, CALCIUM CHLORIDE 600; 310; 30; 20 MG/100ML; MG/100ML; MG/100ML; MG/100ML
INJECTION, SOLUTION INTRAVENOUS CONTINUOUS PRN
Status: DISCONTINUED | OUTPATIENT
Start: 2023-02-16 | End: 2023-02-16 | Stop reason: SURG

## 2023-02-16 RX ORDER — MIDAZOLAM HYDROCHLORIDE 1 MG/ML
0.5 INJECTION INTRAMUSCULAR; INTRAVENOUS
Status: DISCONTINUED | OUTPATIENT
Start: 2023-02-16 | End: 2023-02-16 | Stop reason: HOSPADM

## 2023-02-16 RX ORDER — ROCURONIUM BROMIDE 10 MG/ML
INJECTION, SOLUTION INTRAVENOUS AS NEEDED
Status: DISCONTINUED | OUTPATIENT
Start: 2023-02-16 | End: 2023-02-16 | Stop reason: SURG

## 2023-02-16 RX ORDER — FLUMAZENIL 0.1 MG/ML
0.2 INJECTION INTRAVENOUS AS NEEDED
Status: DISCONTINUED | OUTPATIENT
Start: 2023-02-16 | End: 2023-02-16 | Stop reason: HOSPADM

## 2023-02-16 RX ORDER — SODIUM CHLORIDE 0.9 % (FLUSH) 0.9 %
3-10 SYRINGE (ML) INJECTION AS NEEDED
Status: DISCONTINUED | OUTPATIENT
Start: 2023-02-16 | End: 2023-02-16 | Stop reason: HOSPADM

## 2023-02-16 RX ORDER — ONDANSETRON 2 MG/ML
INJECTION INTRAMUSCULAR; INTRAVENOUS AS NEEDED
Status: DISCONTINUED | OUTPATIENT
Start: 2023-02-16 | End: 2023-02-16 | Stop reason: SURG

## 2023-02-16 RX ORDER — ONDANSETRON 2 MG/ML
4 INJECTION INTRAMUSCULAR; INTRAVENOUS ONCE AS NEEDED
Status: COMPLETED | OUTPATIENT
Start: 2023-02-16 | End: 2023-02-16

## 2023-02-16 RX ORDER — FENTANYL CITRATE 50 UG/ML
50 INJECTION, SOLUTION INTRAMUSCULAR; INTRAVENOUS
Status: DISCONTINUED | OUTPATIENT
Start: 2023-02-16 | End: 2023-02-16 | Stop reason: HOSPADM

## 2023-02-16 RX ORDER — HYDRALAZINE HYDROCHLORIDE 20 MG/ML
5 INJECTION INTRAMUSCULAR; INTRAVENOUS
Status: DISCONTINUED | OUTPATIENT
Start: 2023-02-16 | End: 2023-02-16 | Stop reason: HOSPADM

## 2023-02-16 RX ORDER — MIDAZOLAM HYDROCHLORIDE 1 MG/ML
1 INJECTION INTRAMUSCULAR; INTRAVENOUS
Status: DISCONTINUED | OUTPATIENT
Start: 2023-02-16 | End: 2023-02-16 | Stop reason: HOSPADM

## 2023-02-16 RX ORDER — HYDROCODONE BITARTRATE AND ACETAMINOPHEN 7.5; 325 MG/1; MG/1
1 TABLET ORAL ONCE AS NEEDED
Status: DISCONTINUED | OUTPATIENT
Start: 2023-02-16 | End: 2023-02-16 | Stop reason: HOSPADM

## 2023-02-16 RX ORDER — VANCOMYCIN HYDROCHLORIDE 1 G/200ML
1 INJECTION, SOLUTION INTRAVENOUS ONCE
Status: COMPLETED | OUTPATIENT
Start: 2023-02-16 | End: 2023-02-16

## 2023-02-16 RX ORDER — DEXAMETHASONE SODIUM PHOSPHATE 10 MG/ML
INJECTION INTRAMUSCULAR; INTRAVENOUS AS NEEDED
Status: DISCONTINUED | OUTPATIENT
Start: 2023-02-16 | End: 2023-02-16 | Stop reason: SURG

## 2023-02-16 RX ORDER — LIDOCAINE HYDROCHLORIDE 10 MG/ML
0.5 INJECTION, SOLUTION EPIDURAL; INFILTRATION; INTRACAUDAL; PERINEURAL ONCE AS NEEDED
Status: DISCONTINUED | OUTPATIENT
Start: 2023-02-16 | End: 2023-02-16 | Stop reason: HOSPADM

## 2023-02-16 RX ORDER — VANCOMYCIN HYDROCHLORIDE 1 G/20ML
1 INJECTION, POWDER, LYOPHILIZED, FOR SOLUTION INTRAVENOUS ONCE
Status: DISCONTINUED | OUTPATIENT
Start: 2023-02-16 | End: 2023-02-16

## 2023-02-16 RX ORDER — PROMETHAZINE HYDROCHLORIDE 12.5 MG/1
12.5 TABLET ORAL EVERY 6 HOURS PRN
Qty: 30 TABLET | Refills: 0 | Status: SHIPPED | OUTPATIENT
Start: 2023-02-16

## 2023-02-16 RX ORDER — PROPOFOL 10 MG/ML
VIAL (ML) INTRAVENOUS AS NEEDED
Status: DISCONTINUED | OUTPATIENT
Start: 2023-02-16 | End: 2023-02-16 | Stop reason: SURG

## 2023-02-16 RX ORDER — PROMETHAZINE HYDROCHLORIDE 25 MG/1
25 TABLET ORAL ONCE AS NEEDED
Status: COMPLETED | OUTPATIENT
Start: 2023-02-16 | End: 2023-02-16

## 2023-02-16 RX ORDER — SODIUM CHLORIDE 0.9 % (FLUSH) 0.9 %
3 SYRINGE (ML) INJECTION EVERY 12 HOURS SCHEDULED
Status: DISCONTINUED | OUTPATIENT
Start: 2023-02-16 | End: 2023-02-16 | Stop reason: HOSPADM

## 2023-02-16 RX ORDER — LIDOCAINE HYDROCHLORIDE 20 MG/ML
INJECTION, SOLUTION INFILTRATION; PERINEURAL AS NEEDED
Status: DISCONTINUED | OUTPATIENT
Start: 2023-02-16 | End: 2023-02-16 | Stop reason: SURG

## 2023-02-16 RX ORDER — SODIUM CHLORIDE, SODIUM LACTATE, POTASSIUM CHLORIDE, CALCIUM CHLORIDE 600; 310; 30; 20 MG/100ML; MG/100ML; MG/100ML; MG/100ML
9 INJECTION, SOLUTION INTRAVENOUS CONTINUOUS
Status: DISCONTINUED | OUTPATIENT
Start: 2023-02-16 | End: 2023-02-16 | Stop reason: HOSPADM

## 2023-02-16 RX ORDER — FAMOTIDINE 10 MG/ML
20 INJECTION, SOLUTION INTRAVENOUS ONCE
Status: COMPLETED | OUTPATIENT
Start: 2023-02-16 | End: 2023-02-16

## 2023-02-16 RX ORDER — HYDROMORPHONE HYDROCHLORIDE 1 MG/ML
0.5 INJECTION, SOLUTION INTRAMUSCULAR; INTRAVENOUS; SUBCUTANEOUS
Status: DISCONTINUED | OUTPATIENT
Start: 2023-02-16 | End: 2023-02-16 | Stop reason: HOSPADM

## 2023-02-16 RX ORDER — EPHEDRINE SULFATE 50 MG/ML
5 INJECTION, SOLUTION INTRAVENOUS ONCE AS NEEDED
Status: DISCONTINUED | OUTPATIENT
Start: 2023-02-16 | End: 2023-02-16 | Stop reason: HOSPADM

## 2023-02-16 RX ORDER — ROPIVACAINE HYDROCHLORIDE 5 MG/ML
INJECTION, SOLUTION EPIDURAL; INFILTRATION; PERINEURAL
Status: COMPLETED | OUTPATIENT
Start: 2023-02-16 | End: 2023-02-16

## 2023-02-16 RX ORDER — DIPHENHYDRAMINE HCL 25 MG
25 CAPSULE ORAL
Status: DISCONTINUED | OUTPATIENT
Start: 2023-02-16 | End: 2023-02-16 | Stop reason: HOSPADM

## 2023-02-16 RX ORDER — PROMETHAZINE HYDROCHLORIDE 25 MG/1
25 SUPPOSITORY RECTAL ONCE AS NEEDED
Status: COMPLETED | OUTPATIENT
Start: 2023-02-16 | End: 2023-02-16

## 2023-02-16 RX ORDER — PHENYLEPHRINE HYDROCHLORIDE 10 MG/ML
INJECTION INTRAVENOUS AS NEEDED
Status: DISCONTINUED | OUTPATIENT
Start: 2023-02-16 | End: 2023-02-16 | Stop reason: SURG

## 2023-02-16 RX ORDER — OXYCODONE HYDROCHLORIDE AND ACETAMINOPHEN 5; 325 MG/1; MG/1
1 TABLET ORAL EVERY 4 HOURS PRN
Qty: 30 TABLET | Refills: 0 | Status: SHIPPED | OUTPATIENT
Start: 2023-02-16

## 2023-02-16 RX ADMIN — HYDROMORPHONE HYDROCHLORIDE 0.5 MG: 1 INJECTION, SOLUTION INTRAMUSCULAR; INTRAVENOUS; SUBCUTANEOUS at 18:44

## 2023-02-16 RX ADMIN — SODIUM CHLORIDE, POTASSIUM CHLORIDE, SODIUM LACTATE AND CALCIUM CHLORIDE: 600; 310; 30; 20 INJECTION, SOLUTION INTRAVENOUS at 16:39

## 2023-02-16 RX ADMIN — PROMETHAZINE HYDROCHLORIDE 25 MG: 25 TABLET ORAL at 19:30

## 2023-02-16 RX ADMIN — HYDROMORPHONE HYDROCHLORIDE 0.5 MG: 1 INJECTION, SOLUTION INTRAMUSCULAR; INTRAVENOUS; SUBCUTANEOUS at 18:30

## 2023-02-16 RX ADMIN — FENTANYL CITRATE 50 MCG: 50 INJECTION, SOLUTION INTRAMUSCULAR; INTRAVENOUS at 15:50

## 2023-02-16 RX ADMIN — PHENYLEPHRINE HYDROCHLORIDE 200 MCG: 10 INJECTION, SOLUTION INTRAVENOUS at 16:58

## 2023-02-16 RX ADMIN — MIDAZOLAM 1 MG: 1 INJECTION INTRAMUSCULAR; INTRAVENOUS at 15:50

## 2023-02-16 RX ADMIN — ONDANSETRON 4 MG: 2 INJECTION INTRAMUSCULAR; INTRAVENOUS at 18:01

## 2023-02-16 RX ADMIN — SODIUM CHLORIDE, POTASSIUM CHLORIDE, SODIUM LACTATE AND CALCIUM CHLORIDE 9 ML/HR: 600; 310; 30; 20 INJECTION, SOLUTION INTRAVENOUS at 16:06

## 2023-02-16 RX ADMIN — LIDOCAINE HYDROCHLORIDE 60 MG: 20 INJECTION, SOLUTION INFILTRATION; PERINEURAL at 16:38

## 2023-02-16 RX ADMIN — DEXAMETHASONE SODIUM PHOSPHATE 8 MG: 10 INJECTION INTRAMUSCULAR; INTRAVENOUS at 16:51

## 2023-02-16 RX ADMIN — SUGAMMADEX 200 MG: 100 INJECTION, SOLUTION INTRAVENOUS at 17:37

## 2023-02-16 RX ADMIN — ONDANSETRON 4 MG: 2 INJECTION INTRAMUSCULAR; INTRAVENOUS at 16:51

## 2023-02-16 RX ADMIN — PROPOFOL 100 MG: 10 INJECTION, EMULSION INTRAVENOUS at 16:38

## 2023-02-16 RX ADMIN — ROCURONIUM BROMIDE 50 MG: 50 INJECTION INTRAVENOUS at 16:40

## 2023-02-16 RX ADMIN — FENTANYL CITRATE 50 MCG: 50 INJECTION, SOLUTION INTRAMUSCULAR; INTRAVENOUS at 18:22

## 2023-02-16 RX ADMIN — VANCOMYCIN HYDROCHLORIDE 1 G: 1 INJECTION, SOLUTION INTRAVENOUS at 17:48

## 2023-02-16 RX ADMIN — FAMOTIDINE 20 MG: 10 INJECTION INTRAVENOUS at 16:10

## 2023-02-16 RX ADMIN — ROPIVACAINE HYDROCHLORIDE 20 ML: 5 INJECTION EPIDURAL; INFILTRATION; PERINEURAL at 15:56

## 2023-02-16 NOTE — ANESTHESIA PREPROCEDURE EVALUATION
Anesthesia Evaluation     Patient summary reviewed and Nursing notes reviewed   NPO Solid Status: > 8 hours  NPO Liquid Status: > 2 hours           Airway   Dental      Pulmonary - negative pulmonary ROS   Cardiovascular   Exercise tolerance: good (4-7 METS)    (-) hypertension      Neuro/Psych- negative ROS  (-) seizures, CVA  GI/Hepatic/Renal/Endo - negative ROS   (-) diabetes    Musculoskeletal     Abdominal    Substance History - negative use  (-) alcohol use, drug use     OB/GYN negative ob/gyn ROS         Other   arthritis,                      Anesthesia Plan    ASA 2     general     intravenous induction     Anesthetic plan, risks, benefits, and alternatives have been provided, discussed and informed consent has been obtained with: patient.    Plan discussed with CRNA.        CODE STATUS:

## 2023-02-16 NOTE — ANESTHESIA PROCEDURE NOTES
Airway  Urgency: elective    Date/Time: 2/16/2023 4:43 PM    General Information and Staff    Patient location during procedure: OR  Anesthesiologist: Anup Singletary MD    Indications and Patient Condition  Indications for airway management: airway protection    Preoxygenated: yes  MILS maintained throughout  Mask difficulty assessment: 1 - vent by mask    Final Airway Details  Final airway type: endotracheal airway      Successful airway: ETT  Cuffed: yes   Successful intubation technique: direct laryngoscopy  Endotracheal tube insertion site: oral  Blade: Lanie  Blade size: 4  ETT size (mm): 7.0  Cormack-Lehane Classification: grade I - full view of glottis  Placement verified by: chest auscultation   Number of attempts at approach: 1  Assessment: lips, teeth, and gum same as pre-op and atraumatic intubation

## 2023-02-16 NOTE — ANESTHESIA POSTPROCEDURE EVALUATION
Patient: Tati Hanley    Procedure Summary     Date: 02/16/23 Room / Location:  [facility] [address] [place] [address] [facility]    Anesthesia Start: 1634 Anesthesia Stop: 1749    Procedure: LEFT SHOULDER ARTHROSCOPY BIOPSY FOR INFECTION NO PRE-OPERATIVE ANTIBOTICS (Left: Shoulder) Diagnosis:     Surgeons: Vickey Hair MD Provider: Anup Singletary MD    Anesthesia Type: general ASA Status: 2          Anesthesia Type: general    Vitals  Vitals Value Taken Time   /87 02/16/23 1830   Temp 36.4 °C (97.6 °F) 02/16/23 1747   Pulse 86 02/16/23 1834   Resp 14 02/16/23 1747   SpO2 99 % 02/16/23 1834   Vitals shown include unvalidated device data.        Post Anesthesia Care and Evaluation    Patient location during evaluation: bedside  Patient participation: complete - patient participated  Level of consciousness: awake  Pain management: adequate    Airway patency: patent  Anesthetic complications: No anesthetic complications    Cardiovascular status: acceptable  Respiratory status: acceptable  Hydration status: acceptable    Comments: BP (!) 144/103   Pulse 87   Temp 36.4 °C (97.6 °F) (Oral)   Resp 14   SpO2 100%

## 2023-02-16 NOTE — ANESTHESIA PROCEDURE NOTES
Peripheral Block      Patient reassessed immediately prior to procedure    Patient location during procedure: holding area  Reason for block: at surgeon's request and post-op pain management  Performed by  Anesthesiologist: Anup Singletary MD  Preanesthetic Checklist  Completed: patient identified, IV checked, site marked, risks and benefits discussed, surgical consent, monitors and equipment checked, pre-op evaluation and timeout performed  Prep:  Sterile barriers:cap, gloves, mask and sterile barriers  Prep: ChloraPrep  Patient monitoring: blood pressure monitoring, continuous pulse oximetry and EKG  Procedure    Sedation: yes  Performed under: local infiltration  Guidance:ultrasound guided    ULTRASOUND INTERPRETATION.  Using ultrasound guidance a 21 G gauge needle was placed in close proximity to the nerve, at which point, under ultrasound guidance anesthetic was injected in the area of the nerve and spread of the anesthesia was seen on ultrasound in close proximity thereto.  There were no abnormalities seen on ultrasound; a digital image was taken; and the patient tolerated the procedure with no complications. Images:still images obtained    Laterality:left  Block Type:interscalene  Injection Technique:single-shot  Needle Type:echogenic  Needle Gauge:21 G  Resistance on Injection: none    Medications Used: ropivacaine (NAROPIN) 0.5 % injection - Injection   20 mL - 2/16/2023 3:56:00 PM      Post Assessment  Injection Assessment: negative aspiration for heme, no paresthesia on injection and incremental injection  Patient Tolerance:comfortable throughout block  Complications:no  Additional Notes  Ultrasound interpretation note:  Under ultrasound guidance, needle seen near nerves, local seen spreading around.  No abnormalities noted.  Block for postop pain per surgeon request.

## 2023-02-19 LAB
BACTERIA SPEC AEROBE CULT: NORMAL
GRAM STN SPEC: NORMAL

## 2023-02-22 ENCOUNTER — TRANSCRIBE ORDERS (OUTPATIENT)
Dept: PHYSICAL THERAPY | Facility: CLINIC | Age: 68
End: 2023-02-22
Payer: COMMERCIAL

## 2023-02-22 DIAGNOSIS — M25.512 LEFT SHOULDER PAIN, UNSPECIFIED CHRONICITY: Primary | ICD-10-CM

## 2023-02-26 LAB
BACTERIA SPEC ANAEROBE CULT: NORMAL

## 2023-02-27 ENCOUNTER — TREATMENT (OUTPATIENT)
Dept: PHYSICAL THERAPY | Facility: CLINIC | Age: 68
End: 2023-02-27
Payer: COMMERCIAL

## 2023-02-27 DIAGNOSIS — Z96.612 H/O TOTAL SHOULDER REPLACEMENT, LEFT: ICD-10-CM

## 2023-02-27 DIAGNOSIS — M25.512 ACUTE PAIN OF LEFT SHOULDER: Primary | ICD-10-CM

## 2023-02-27 DIAGNOSIS — R68.89 IMPAIRED FUNCTION OF UPPER EXTREMITY: ICD-10-CM

## 2023-02-27 DIAGNOSIS — Z98.890 S/P ARTHROSCOPY OF LEFT SHOULDER: ICD-10-CM

## 2023-02-27 PROCEDURE — 97110 THERAPEUTIC EXERCISES: CPT | Performed by: PHYSICAL THERAPIST

## 2023-02-27 PROCEDURE — G0283 ELEC STIM OTHER THAN WOUND: HCPCS | Performed by: PHYSICAL THERAPIST

## 2023-02-27 PROCEDURE — 97140 MANUAL THERAPY 1/> REGIONS: CPT | Performed by: PHYSICAL THERAPIST

## 2023-02-27 PROCEDURE — 97162 PT EVAL MOD COMPLEX 30 MIN: CPT | Performed by: PHYSICAL THERAPIST

## 2023-02-27 NOTE — PROGRESS NOTES
Physical Therapy Initial Evaluation and Plan of Care  [facility] Physical Therapy [facility]  [address], [address]  [place] [address]    Patient: Tati Hanley   : 1955  Diagnosis/ICD-10 Code:  Acute pain of left shoulder [M25.512]  Referring practitioner: Vickey Hair MD  Today's Date: 2023    Subjective Evaluation    History of Present Illness  Date of surgery: 2023  Mechanism of injury: Previously had left TSR 22 - continued to have pain and limitation of motion, oren SALEEM wanted to see if any infection present in existing repair so went in again for arthroscopy to debridement adhesions and check status of previous repair.  Position of the prosthesis was good, still awaiting results of biopsy for infection  Went back to MD last week and discussed surgical outcomes  MD did not give her full expectations of surgical outcomes  Since surgery she has been doing some of the mobility exercise  Taking Hydrocodone nightly to sleep, takes ADvil        Patient Occupation: Psychologist Pain  Current pain ratin  At best pain ratin  At worst pain ratin  Location: left shoulder anterior, lateral and posterior joint, incisional pain, no radiation of sy, no NT  Quality: knife-like, sharp, tight, discomfort and dull ache  Relieving factors: medications, ice and rest  Aggravating factors: sleeping, outstretched reach, repetitive movement, overhead activity and lifting  Progression: no change    Social Support  Lives with: spouse    Hand dominance: right    Diagnostic Tests  X-ray: normal    Treatments  Previous treatment: medication and physical therapy  Current treatment: medication and physical therapy  Patient Goals  Patient goal: Get better motion than we had prior to surgery, return to yoga           Objective          Observations     Additional Shoulder Observation Details  Resolving ecchymosis anterior shoulder, 3 healing portal sites    Palpation    Left   Hypertonic in the pectoralis major and upper trapezius.   Tenderness of the anterior deltoid, biceps, pectoralis major, supraspinatus and upper trapezius.     Tenderness     Left Shoulder   Tenderness in the supraspinatus tendon.     Neurological Testing     Sensation     Shoulder   Left Shoulder   Intact: light touch    Active Range of Motion   Left Shoulder   Flexion: 91 degrees with pain  Extension: 30 degrees with pain  Abduction: 46 degrees   External rotation 45°: 30 degrees with pain  Internal rotation 45°: 50 degrees with pain    Passive Range of Motion   Left Shoulder   Flexion: 134 degrees with pain  Abduction: 107 degrees with pain  External rotation 45°: 36 degrees   Internal rotation 45°: 58 degrees with pain    Strength/Myotome Testing     Left Shoulder     Planes of Motion   Flexion: 4-   Extension: 4   Abduction: 3   External rotation at 0°: 3+   Internal rotation at 0°: 4     Tests     Left Shoulder   Positive painful arc.           Assessment & Plan     Assessment  Impairments: abnormal muscle firing, abnormal muscle tone, abnormal or restricted ROM, activity intolerance, impaired physical strength, lacks appropriate home exercise program, pain with function and safety issue  Functional Limitations: carrying objects, lifting, pulling, pushing, reaching behind back, reaching overhead and unable to perform repetitive tasks  Assessment details: 67 y.o. female seen 10 days post left shoulder arthroscopy and 8 months post left TSA presents with: 1. Left shoulder pain, 2. Decreased shoulder AROM, 3. Tenderness to palpation left anterior shoulder, 4. Decreased strength left UE, 5. Pain limiting many normal ADL's, 6. Quick Dash score of 43.18     Barriers to therapy: increased pain since previous surgery   Prognosis: good    Goals  Plan Goals: Short Term Goals: 4 weeks  Patient will be able to tolerate initial exercises  Patient will have pain <5/10  Patient will be able to sleep without pain  interruption  Patient will be able to dress self with 50% less difficulty     Long Term Goals: 12 weeks  Patient will be independent in performing home exercise program.  Patient will have functional pain free shoulder  AROM  Patient will be able to reach into her upper kitchen cabinets to retrieve light items without pain  Patient will be able to open and close her care door with the left arm without pain    Plan  Therapy options: will be seen for skilled therapy services  Planned modality interventions: electrical stimulation/Russian stimulation and cryotherapy  Planned therapy interventions: manual therapy, strengthening, stretching, therapeutic activities, home exercise program, neuromuscular re-education and joint mobilization  Frequency: 2x week  Duration in visits: 16  Duration in weeks: 8  Treatment plan discussed with: patient  Plan details: Patient issued written HEP of exercises performed in clinic today          Manual Therapy:    15     mins  50940;  Therapeutic Exercise:    15     mins  16192;     Neuromuscular Alana:    0    mins  09996;    Therapeutic Activity:     0     mins  94080;     Ultrasound                  __0_  mins  79137  Iontophoresis                 0    mins  64150    Electrical Stimulation     15    mins  11273 ([id number])  Traction                         _0  mins  12566     Evaluation Time:     20  mins  Timed Treatment:   30   mins   Total Treatment:     70   mins    PT: Bhavna Davis PT     License Number: [place] [id number]  Electronically signed by Bhavna Davis PT, 02/27/23, 12:10 PM EST    Certification Period: 2/28/2023 thru 5/28/2023  I certify that the therapy services are furnished while this patient is under my care.  The services outlined above are required by this patient, and will be reviewed every 90 days.         Physician Signature:__________________________________________________    PHYSICIAN: Vickey Hair MD      DATE:     Please sign and return via fax to  [email or web address] . Thank you, [facility].    PT SIGNATURE: Bhavna Davis, PT   [place] LICENSE:  [id number]

## 2023-03-02 ENCOUNTER — TREATMENT (OUTPATIENT)
Dept: PHYSICAL THERAPY | Facility: CLINIC | Age: 68
End: 2023-03-02
Payer: COMMERCIAL

## 2023-03-02 DIAGNOSIS — Z98.890 S/P ARTHROSCOPY OF LEFT SHOULDER: ICD-10-CM

## 2023-03-02 DIAGNOSIS — R68.89 IMPAIRED FUNCTION OF UPPER EXTREMITY: ICD-10-CM

## 2023-03-02 DIAGNOSIS — Z96.612 H/O TOTAL SHOULDER REPLACEMENT, LEFT: ICD-10-CM

## 2023-03-02 DIAGNOSIS — M25.512 ACUTE PAIN OF LEFT SHOULDER: Primary | ICD-10-CM

## 2023-03-02 PROCEDURE — 97110 THERAPEUTIC EXERCISES: CPT | Performed by: PHYSICAL THERAPIST

## 2023-03-02 PROCEDURE — 97530 THERAPEUTIC ACTIVITIES: CPT | Performed by: PHYSICAL THERAPIST

## 2023-03-02 PROCEDURE — G0283 ELEC STIM OTHER THAN WOUND: HCPCS | Performed by: PHYSICAL THERAPIST

## 2023-03-02 PROCEDURE — 97140 MANUAL THERAPY 1/> REGIONS: CPT | Performed by: PHYSICAL THERAPIST

## 2023-03-02 NOTE — PROGRESS NOTES
Physical Therapy Daily Treatment Note  [facility] Physical Therapy [facility]  [address], [address]  [place] [address]    Patient: Tati Hanley   : 1955  Referring practitioner: Vickey Hair MD  Today's Date: 3/2/2023  Patient seen for 2 sessions    Visit Diagnoses:    ICD-10-CM ICD-9-CM   1. Acute pain of left shoulder  M25.512 719.41   2. Impaired function of upper extremity  R68.89 V49.5   3. S/P arthroscopy of left shoulder  Z98.890 V45.89   4. H/O total shoulder replacement, left  Z96.612 V43.61       Subjective   Tati Hanley reports: that she was pretty sore in the anterior and posterior shoulder after her last session.  Has pinching sensation in the left clavicular region after activity.       Objective   Tenderness in left anterior scalene mm as well as anterior portal site    See Exercise, Manual, and Modality Logs for complete treatment.     Patient Education/therapeutic activiites: new cane exercises for HEP, home desensitization, use of Home TENS unit    Assessment/Plan  Pain preventing much exercise at this point.  Is hypersensitive in the anterior joint.  Needs to do some desensitization techniques.    Progress strengthening /stabilization /functional activity           Timed:  Manual Therapy:    15     mins  86446;  Therapeutic Exercise:    15     mins  77991;     Neuromuscular Alana:    0    mins  94888;    Therapeutic Activity:     10     mins  56377;     Ultrasound:      0     mins  86165;    Iontophoresis              __0_   mins  Dry Needling               _____   mins        Untimed:  Electrical Stimulation:     15    mins  22428 ( );  Mechanical Traction:             mins  49167;   Paraffin                       _____  mins     Timed Treatment:   40   mins   Total Treatment:     70   mins    Bhavna Davis PT  [place] License # [id number]  Physical Therapist    Electronically signed by Bhavna Davis PT, 23, 11:42 AM EST

## 2023-03-07 ENCOUNTER — TREATMENT (OUTPATIENT)
Dept: PHYSICAL THERAPY | Facility: CLINIC | Age: 68
End: 2023-03-07
Payer: COMMERCIAL

## 2023-03-07 DIAGNOSIS — Z96.612 H/O TOTAL SHOULDER REPLACEMENT, LEFT: ICD-10-CM

## 2023-03-07 DIAGNOSIS — R68.89 IMPAIRED FUNCTION OF UPPER EXTREMITY: ICD-10-CM

## 2023-03-07 DIAGNOSIS — M25.512 ACUTE PAIN OF LEFT SHOULDER: Primary | ICD-10-CM

## 2023-03-07 DIAGNOSIS — Z98.890 S/P ARTHROSCOPY OF LEFT SHOULDER: ICD-10-CM

## 2023-03-07 PROCEDURE — 97140 MANUAL THERAPY 1/> REGIONS: CPT | Performed by: PHYSICAL THERAPIST

## 2023-03-07 PROCEDURE — 97110 THERAPEUTIC EXERCISES: CPT | Performed by: PHYSICAL THERAPIST

## 2023-03-07 PROCEDURE — G0283 ELEC STIM OTHER THAN WOUND: HCPCS | Performed by: PHYSICAL THERAPIST

## 2023-03-07 NOTE — PROGRESS NOTES
Physical Therapy Daily Treatment Note  [facility] Physical Therapy [facility]  [address], [address]  [place] [address]    Patient: Tati Hanley   : 1955  Referring practitioner: Vickey Hair MD  Today's Date: 3/7/2023  Patient seen for 3 sessions    Visit Diagnoses:    ICD-10-CM ICD-9-CM   1. Acute pain of left shoulder  M25.512 719.41   2. Impaired function of upper extremity  R68.89 V49.5   3. S/P arthroscopy of left shoulder  Z98.890 V45.89   4. H/O total shoulder replacement, left  Z96.612 V43.61       Subjective   Tati Hanley reports: that she has been using her home TNS unit but may have had it turned up too high as it was causing her muscles to spasm.  She added her new home exercises. Pain varies from 4-7/10.  Pain is cutting in anterior joint but is more aching in the posterior joint.        Objective   Passive flexion 150*    Very sensitive to light touch anterior shoulder joint    See Exercise, Manual, and Modality Logs for complete treatment.     Patient Education: use pulleys to warm up at home before other exercise    Assessment/Plan  Less pain with passive flexion today but still quite a  Bit of pain with External rotation.  Hypersensitivity in anterior shoulder circa scar.  Poor tolerance to aggressive stretching so need to be more gentle with slow steady stretches    Progress strengthening /stabilization /functional activity           Timed:  Manual Therapy:    15     mins  10998;  Therapeutic Exercise:    25     mins  31255;     Neuromuscular Alana:    0    mins  69754;    Therapeutic Activity:     0     mins  62442;     Ultrasound:      0     mins  72631;    Iontophoresis              __0_   mins  Dry Needling               _____   mins        Untimed:  Electrical Stimulation:     15    mins  27005 ( );  Mechanical Traction:             mins  54889;   Paraffin                       _____  mins     Timed Treatment:   40   mins   Total  Treatment:     70   mins    Bhavna Davis, PT  [place] License # [id number]  Physical Therapist    Electronically signed by Bhavna Davis, PT, 03/07/23, 4:43 PM EST

## 2023-03-10 ENCOUNTER — TREATMENT (OUTPATIENT)
Dept: PHYSICAL THERAPY | Facility: CLINIC | Age: 68
End: 2023-03-10
Payer: COMMERCIAL

## 2023-03-10 DIAGNOSIS — Z98.890 S/P ARTHROSCOPY OF LEFT SHOULDER: ICD-10-CM

## 2023-03-10 DIAGNOSIS — Z96.612 H/O TOTAL SHOULDER REPLACEMENT, LEFT: ICD-10-CM

## 2023-03-10 DIAGNOSIS — R68.89 IMPAIRED FUNCTION OF UPPER EXTREMITY: Primary | ICD-10-CM

## 2023-03-10 DIAGNOSIS — Z96.612 STATUS POST TOTAL SHOULDER ARTHROPLASTY, LEFT: ICD-10-CM

## 2023-03-10 PROCEDURE — 97110 THERAPEUTIC EXERCISES: CPT | Performed by: PHYSICAL THERAPIST

## 2023-03-10 PROCEDURE — G0283 ELEC STIM OTHER THAN WOUND: HCPCS | Performed by: PHYSICAL THERAPIST

## 2023-03-10 PROCEDURE — 97140 MANUAL THERAPY 1/> REGIONS: CPT | Performed by: PHYSICAL THERAPIST

## 2023-03-16 ENCOUNTER — TREATMENT (OUTPATIENT)
Dept: PHYSICAL THERAPY | Facility: CLINIC | Age: 68
End: 2023-03-16
Payer: COMMERCIAL

## 2023-03-16 DIAGNOSIS — Z96.612 H/O TOTAL SHOULDER REPLACEMENT, LEFT: ICD-10-CM

## 2023-03-16 DIAGNOSIS — R68.89 IMPAIRED FUNCTION OF UPPER EXTREMITY: Primary | ICD-10-CM

## 2023-03-16 DIAGNOSIS — M25.512 ACUTE PAIN OF LEFT SHOULDER: ICD-10-CM

## 2023-03-16 DIAGNOSIS — Z98.890 S/P ARTHROSCOPY OF LEFT SHOULDER: ICD-10-CM

## 2023-03-16 LAB
FUNGUS WND CULT: NORMAL

## 2023-03-16 PROCEDURE — 97110 THERAPEUTIC EXERCISES: CPT | Performed by: PHYSICAL THERAPIST

## 2023-03-16 PROCEDURE — G0283 ELEC STIM OTHER THAN WOUND: HCPCS | Performed by: PHYSICAL THERAPIST

## 2023-03-16 PROCEDURE — 97140 MANUAL THERAPY 1/> REGIONS: CPT | Performed by: PHYSICAL THERAPIST

## 2023-03-16 NOTE — PROGRESS NOTES
Physical Therapy Daily Treatment Note  [facility] Physical Therapy [facility]  [address], [address]  [place] [address]    Patient: Tati Hanley   : 1955  Referring practitioner: Vickey Hair MD  Today's Date: 3/16/2023  Patient seen for 5 sessions    Visit Diagnoses:    ICD-10-CM ICD-9-CM   1. Impaired function of upper extremity  R68.89 V49.5   2. S/P arthroscopy of left shoulder  Z98.890 V45.89   3. H/O total shoulder replacement, left  Z96.612 V43.61   4. Acute pain of left shoulder  M25.512 719.41       Subjective   Tati Hanley reports: that she was sick for multiple days but now feels better. Reports that she has been compliant with the shoulder exercises.  STates that seh feels like she was just cut in the shoulder at the portal site      Objective   Portal site on anterior incision site a bit irritated    Very sensitive to palpation of anterior joint soft tissues    Passive flexion 150* with slight over pressure    See Exercise, Manual, and Modality Logs for complete treatment.     Patient Education: Cont stretches    Assessment/Plan  Patient with persistent pain with all end ranges of motion.  Able to reach behind her back to spine level    Progress strengthening /stabilization /functional activity           Timed:  Manual Therapy:    15     mins  02677;  Therapeutic Exercise:    25/40     mins  62202;     Neuromuscular Alana:    0    mins  34127;    Therapeutic Activity:     0     mins  43758;     Ultrasound:      0     mins  18435;    Iontophoresis              __0_   mins  Dry Needling               _____   mins        Untimed:  Electrical Stimulation:     15    mins  54623 ( );  Mechanical Traction:             mins  20758;   Paraffin                       _____  mins     Timed Treatment:   40   mins   Total Treatment:     75   mins    Bhavna Davis PT  [place] License # [id number]  Physical Therapist    Electronically signed by Bhavna Davis PT,  03/16/23, 2:35 PM EDT

## 2023-03-20 ENCOUNTER — TREATMENT (OUTPATIENT)
Dept: PHYSICAL THERAPY | Facility: CLINIC | Age: 68
End: 2023-03-20
Payer: COMMERCIAL

## 2023-03-20 DIAGNOSIS — Z96.612 H/O TOTAL SHOULDER REPLACEMENT, LEFT: ICD-10-CM

## 2023-03-20 DIAGNOSIS — Z98.890 S/P ARTHROSCOPY OF LEFT SHOULDER: ICD-10-CM

## 2023-03-20 DIAGNOSIS — R68.89 IMPAIRED FUNCTION OF UPPER EXTREMITY: Primary | ICD-10-CM

## 2023-03-20 DIAGNOSIS — M25.512 ACUTE PAIN OF LEFT SHOULDER: ICD-10-CM

## 2023-03-20 PROCEDURE — 97140 MANUAL THERAPY 1/> REGIONS: CPT | Performed by: PHYSICAL THERAPIST

## 2023-03-20 PROCEDURE — 97110 THERAPEUTIC EXERCISES: CPT | Performed by: PHYSICAL THERAPIST

## 2023-03-20 PROCEDURE — G0283 ELEC STIM OTHER THAN WOUND: HCPCS | Performed by: PHYSICAL THERAPIST

## 2023-03-20 NOTE — PROGRESS NOTES
Physical Therapy Daily Treatment Note  [facility] Physical Therapy [facility]  [address], [address]  [place] [address]    Patient: Tati Hanley   : 1955  Referring practitioner: Vickey Hair MD  Today's Date: 3/20/2023  Patient seen for 6 sessions    Visit Diagnoses:    ICD-10-CM ICD-9-CM   1. Impaired function of upper extremity  R68.89 V49.5   2. S/P arthroscopy of left shoulder  Z98.890 V45.89   3. H/O total shoulder replacement, left  Z96.612 V43.61   4. Acute pain of left shoulder  M25.512 719.41       Subjective   Tati Hanley reports: that she was sore after her last session and sore after doing the exercises at home.Pain mostly in anterior shoulder. Minimal popping or cracking.  Feels like her motion is not changing.      Objective   Left shoulder Flexion 152*, Abduction  140*  passive    Active flexion 95*,  abduction   69*    See Exercise, Manual, and Modality Logs for complete treatment.     Patient Education:    Assessment/Plan  Tati has demonstrated some increased motion in both active and passive modes.  Less increase motion noted in active flexion however.    Progress strengthening /stabilization /functional activity           Timed:  Manual Therapy:    15     mins  57014;  Therapeutic Exercise:    25/35     mins  27780;     Neuromuscular Alana:    0    mins  62877;    Therapeutic Activity:     0     mins  95612;     Ultrasound:      0     mins  72360;    Iontophoresis              __0_   mins  Dry Needling               _____   mins        Untimed:  Electrical Stimulation:     15    mins  93031 ( );  Mechanical Traction:             mins  96583;   Paraffin                       _____  mins     Timed Treatment:   40   mins   Total Treatment:     75   mins    Bhavna Davis PT  [place] License # [id number]  Physical Therapist    Electronically signed by Bhavna Davis PT, 23, 3:08 PM EDT

## 2023-03-22 ENCOUNTER — TREATMENT (OUTPATIENT)
Dept: PHYSICAL THERAPY | Facility: CLINIC | Age: 68
End: 2023-03-22
Payer: COMMERCIAL

## 2023-03-22 DIAGNOSIS — R68.89 IMPAIRED FUNCTION OF UPPER EXTREMITY: Primary | ICD-10-CM

## 2023-03-22 DIAGNOSIS — Z96.612 H/O TOTAL SHOULDER REPLACEMENT, LEFT: ICD-10-CM

## 2023-03-22 DIAGNOSIS — M25.512 ACUTE PAIN OF LEFT SHOULDER: ICD-10-CM

## 2023-03-22 DIAGNOSIS — Z98.890 S/P ARTHROSCOPY OF LEFT SHOULDER: ICD-10-CM

## 2023-03-22 PROCEDURE — 97110 THERAPEUTIC EXERCISES: CPT | Performed by: PHYSICAL THERAPIST

## 2023-03-22 PROCEDURE — G0283 ELEC STIM OTHER THAN WOUND: HCPCS | Performed by: PHYSICAL THERAPIST

## 2023-03-22 PROCEDURE — 97140 MANUAL THERAPY 1/> REGIONS: CPT | Performed by: PHYSICAL THERAPIST

## 2023-03-22 NOTE — PROGRESS NOTES
Physical Therapy Daily Treatment Note  [facility] Physical Therapy [facility]  [address], [address]  [place] [address]    Patient: Tati Hanley   : 1955  Referring practitioner: Vickey Hair MD  Today's Date: 3/22/2023  Patient seen for 7 sessions    Visit Diagnoses:    ICD-10-CM ICD-9-CM   1. Impaired function of upper extremity  R68.89 V49.5   2. S/P arthroscopy of left shoulder  Z98.890 V45.89   3. H/O total shoulder replacement, left  Z96.612 V43.61   4. Acute pain of left shoulder  M25.512 719.41       Subjective   Tati Hanley reports: that she was more sore after last session.  Still has pain with lifting her arm.      Objective   Very tender to light palpation in the anterior shoulder but not quite as sensitive as she was 2 weeks ago    Pinch sensation in posterior shoulder noted with end range ER    See Exercise, Manual, and Modality Logs for complete treatment.     Patient Education: cont HEP    Assessment/Plan  Tati continues to have a posterior impingement with ER despite posterior mob of GH joint.  Pain prevents progression of exercises    Progress strengthening /stabilization /functional activity  Assess for MD after next visit         Timed:  Manual Therapy:    15     mins  56366;  Therapeutic Exercise:    25/35     mins  44901;     Neuromuscular Alana:    0    mins  26645;    Therapeutic Activity:     0     mins  41767;     Ultrasound:      0     mins  19496;    Iontophoresis              __0_   mins  Dry Needling               _____   mins        Untimed:  Electrical Stimulation:     15    mins  77280 ( );  Mechanical Traction:             mins  92441;   Paraffin                       _____  mins     Timed Treatment:   40   mins   Total Treatment:     70   mins    Bhavna Davis PT  [place] License # [id number]  Physical Therapist    Electronically signed by Bhavna Davis PT, 23, 4:06 PM EDT

## 2023-03-27 ENCOUNTER — TREATMENT (OUTPATIENT)
Dept: PHYSICAL THERAPY | Facility: CLINIC | Age: 68
End: 2023-03-27
Payer: COMMERCIAL

## 2023-03-27 DIAGNOSIS — Z96.612 H/O TOTAL SHOULDER REPLACEMENT, LEFT: ICD-10-CM

## 2023-03-27 DIAGNOSIS — R68.89 IMPAIRED FUNCTION OF UPPER EXTREMITY: Primary | ICD-10-CM

## 2023-03-27 DIAGNOSIS — Z98.890 S/P ARTHROSCOPY OF LEFT SHOULDER: ICD-10-CM

## 2023-03-27 PROCEDURE — 97530 THERAPEUTIC ACTIVITIES: CPT | Performed by: PHYSICAL THERAPIST

## 2023-03-27 PROCEDURE — 97140 MANUAL THERAPY 1/> REGIONS: CPT | Performed by: PHYSICAL THERAPIST

## 2023-03-27 PROCEDURE — G0283 ELEC STIM OTHER THAN WOUND: HCPCS | Performed by: PHYSICAL THERAPIST

## 2023-03-27 PROCEDURE — 97110 THERAPEUTIC EXERCISES: CPT | Performed by: PHYSICAL THERAPIST

## 2023-03-27 NOTE — PROGRESS NOTES
Physical Therapy Daily Treatment Note  [facility] Physical Therapy [facility]  [address], [address]  [place] [address]    Patient: Tati Hanley   : 1955  Referring practitioner: Vickey Hair MD  Today's Date: 3/27/2023  Patient seen for 8 sessions    Visit Diagnoses:    ICD-10-CM ICD-9-CM   1. Impaired function of upper extremity  R68.89 V49.5   2. S/P arthroscopy of left shoulder  Z98.890 V45.89   3. H/O total shoulder replacement, left  Z96.612 V43.61       Subjective   Tati Hanley reports: that she just saw Dr Hair and he discussed an additional procedure.   Discussed a possible RTSA or a possible open lesion lysis procedure.  States that he did not say anything else about additional therapy but did check her range.  States that      Objective   Restricted shoulder motion in all planes    Tenderness to palpation anterior joint line    See Exercise, Manual, and Modality Logs for complete treatment.     Patient Education: add new thoracic mobility exercises to HEP    Assessment/Plan  Pain prevents much stretching of GH joint at this time.  Today did more thoracic mobilization activity to attempt to gain more mobility.  Patient very frustrated with persistent pain and lack of improvement.      Progress strengthening /stabilization /functional activity           Timed:  Manual Therapy:    14     mins  20792;  Therapeutic Exercise:    15/30     mins  38698;     Neuromuscular Alana:    0    mins  17902;    Therapeutic Activity:     10     mins  62448;     Ultrasound:      0     mins  10885;    Iontophoresis              __0_   mins  Dry Needling               _____   mins        Untimed:  Electrical Stimulation:     15    mins  23984 ( );  Mechanical Traction:             mins  80419;   Paraffin                       _____  mins     Timed Treatment:   39   mins   Total Treatment:     70   mins    Bhavna Davis, PT  [place] License # [id number]  Physical  Therapist    Electronically signed by Bhavna Davis, PT, 03/27/23, 2:08 PM EDT

## 2023-03-29 ENCOUNTER — TREATMENT (OUTPATIENT)
Dept: PHYSICAL THERAPY | Facility: CLINIC | Age: 68
End: 2023-03-29
Payer: COMMERCIAL

## 2023-03-29 DIAGNOSIS — R68.89 IMPAIRED FUNCTION OF UPPER EXTREMITY: Primary | ICD-10-CM

## 2023-03-29 DIAGNOSIS — M25.512 ACUTE PAIN OF LEFT SHOULDER: ICD-10-CM

## 2023-03-29 DIAGNOSIS — Z98.890 S/P ARTHROSCOPY OF LEFT SHOULDER: ICD-10-CM

## 2023-03-29 DIAGNOSIS — Z96.612 H/O TOTAL SHOULDER REPLACEMENT, LEFT: ICD-10-CM

## 2023-03-29 PROCEDURE — G0283 ELEC STIM OTHER THAN WOUND: HCPCS | Performed by: PHYSICAL THERAPIST

## 2023-03-29 PROCEDURE — 97140 MANUAL THERAPY 1/> REGIONS: CPT | Performed by: PHYSICAL THERAPIST

## 2023-03-29 PROCEDURE — 97110 THERAPEUTIC EXERCISES: CPT | Performed by: PHYSICAL THERAPIST

## 2023-03-29 NOTE — PROGRESS NOTES
Physical Therapy Daily Treatment Note  [facility] Physical Therapy [facility]  [address]  [place] [address]    Patient: Tati Hanley   : 1955  Referring practitioner: Vickey Hair MD  Today's Date: 3/29/2023  Patient seen for 9 sessions    Visit Diagnoses:    ICD-10-CM ICD-9-CM   1. Impaired function of upper extremity  R68.89 V49.5   2. S/P arthroscopy of left shoulder  Z98.890 V45.89   3. H/O total shoulder replacement, left  Z96.612 V43.61   4. Acute pain of left shoulder  M25.512 719.41       Subjective   Tati Hanley reports: that she was not as sore after her last session but notes that we did not do much.        Objective   Tenderness in anterior shoulder joint     Unable to flex greater than 90* in standing ceiling punch style but was able to actively flex with long lever arm in standing to 120*    See Exercise, Manual, and Modality Logs for complete treatment.     Patient Education: desensitization anterior shoulder    Assessment/Plan  Patient with persistent pain and tightness especially in external rotation.  Working on additional thoracic mobility to assist in elevation.      Progress strengthening /stabilization /functional activity           Timed:  Manual Therapy:    15     mins  79383;  Therapeutic Exercise:    20     mins  36292;     Neuromuscular Alana:    0    mins  32690;    Therapeutic Activity:     0     mins  62134;     Ultrasound:      0     mins  78174;    Iontophoresis              __0_   mins  Dry Needling               _____   mins        Untimed:  Electrical Stimulation:     15    mins  54104 ( );  Mechanical Traction:             mins  62793;   Paraffin                       _____  mins     Timed Treatment:   35   mins   Total Treatment:     60   mins    Bhavna Davis PT  [place] License # [id number]  Physical Therapist    Electronically signed by Bhavna Davis PT, 23, 2:06 PM EDT

## 2023-04-13 ENCOUNTER — TELEPHONE (OUTPATIENT)
Dept: NEUROLOGY | Facility: CLINIC | Age: 68
End: 2023-04-13
Payer: COMMERCIAL

## 2023-04-13 ENCOUNTER — TREATMENT (OUTPATIENT)
Dept: PHYSICAL THERAPY | Facility: CLINIC | Age: 68
End: 2023-04-13
Payer: COMMERCIAL

## 2023-04-13 DIAGNOSIS — M25.512 ACUTE PAIN OF LEFT SHOULDER: ICD-10-CM

## 2023-04-13 DIAGNOSIS — Z98.890 S/P ARTHROSCOPY OF LEFT SHOULDER: ICD-10-CM

## 2023-04-13 DIAGNOSIS — R68.89 IMPAIRED FUNCTION OF UPPER EXTREMITY: Primary | ICD-10-CM

## 2023-04-13 DIAGNOSIS — Z96.612 H/O TOTAL SHOULDER REPLACEMENT, LEFT: ICD-10-CM

## 2023-04-13 PROCEDURE — 97110 THERAPEUTIC EXERCISES: CPT | Performed by: PHYSICAL THERAPIST

## 2023-04-13 PROCEDURE — G0283 ELEC STIM OTHER THAN WOUND: HCPCS | Performed by: PHYSICAL THERAPIST

## 2023-04-13 PROCEDURE — 97140 MANUAL THERAPY 1/> REGIONS: CPT | Performed by: PHYSICAL THERAPIST

## 2023-04-13 NOTE — PROGRESS NOTES
Physical Therapy Daily Treatment Note  [facility] Physical Therapy [facility]  [address], [address]  [place] [address]    Patient: Tati Hanley   : 1955  Referring practitioner: Vickey Hair MD  Today's Date: 2023  Patient seen for 10 sessions    Visit Diagnoses:    ICD-10-CM ICD-9-CM   1. Impaired function of upper extremity  R68.89 V49.5   2. S/P arthroscopy of left shoulder  Z98.890 V45.89   3. H/O total shoulder replacement, left  Z96.612 V43.61   4. Acute pain of left shoulder  M25.512 719.41       Subjective   Tati Hanley reports: no significant improvement.  Compliant with HEP.  Sleeping a bit better but still not uninterrupted.  Shoulder still feels locked up with efforts of flexion      Objective   Tenderness along anterior incision    Impingement sensation at end ranges of flexion and abduction     See Exercise, Manual, and Modality Logs for complete treatment.     Patient Education: Try Tylenol pm to sleep    Assessment/Plan  Tati continues to have more pain than expected post shoulder arthroscopy.  Muscle imblanace preventing fluid elevation movements.  To see MD after next visit.     Progress per Plan of Care           Timed:  Manual Therapy:    14     mins  31640;  Therapeutic Exercise:    15/30     mins  51029;     Neuromuscular Alana:    0    mins  80158;    Therapeutic Activity:     0     mins  37377;     Ultrasound:      0     mins  41496;    Iontophoresis              __0_   mins  Dry Needling               _____   mins        Untimed:  Electrical Stimulation:     15    mins  04939 ( );  Mechanical Traction:             mins  58301;   Paraffin                       _____  mins     Timed Treatment:   29   mins   Total Treatment:     60   mins    Bhavna Davis PT  [place] License # [id number]  Physical Therapist    Electronically signed by Bhavna Davis PT, 23, 4:06 PM EDT

## 2023-04-17 ENCOUNTER — TREATMENT (OUTPATIENT)
Dept: PHYSICAL THERAPY | Facility: CLINIC | Age: 68
End: 2023-04-17
Payer: COMMERCIAL

## 2023-04-17 DIAGNOSIS — Z98.890 S/P ARTHROSCOPY OF LEFT SHOULDER: ICD-10-CM

## 2023-04-17 DIAGNOSIS — Z96.612 H/O TOTAL SHOULDER REPLACEMENT, LEFT: ICD-10-CM

## 2023-04-17 DIAGNOSIS — R68.89 IMPAIRED FUNCTION OF UPPER EXTREMITY: Primary | ICD-10-CM

## 2023-04-17 NOTE — PROGRESS NOTES
MD Letter - Reassessment  [facility] Physical Therapy - [facility]  [address], [address]  [place] [id number]  Patient: Tati Hanley   : 1955  Vickey Hair MD  Date of Initial Visit: Type: THERAPY  Noted: 2023  Today's Date: 2023  Patient seen for 11 sessions    Treatment has included: therapeutic exercise, neuromuscular re-education, manual therapy, therapeutic activity, electrical stimulation and cryotherapy    Subjective   Tati states that she is still having considerable pain throughout the day and night.  Her pain varies from 1-4/10.  Her pain is the worst at night preventing her sleep.  She is not taking any narcotic pain medication but takes Advil daily.  She denies any radiation of pain into the left arm.  She notes that her motion is a bit improved.    Objective   Shoulder AROM/ - Flexion 130*,  Abduction 82*,  ER  54*,  IR 70*  Strength - pain inhibition with all planes but has pat least 3+/5 strength with isometric testing  Sensation - intact  Palpation - very sensitive to touch at incision site  Special tests - positive Impingement    Assessment/Plan  Patient has demonstrated moderate improvement since the initiation of therapy.  The pain has decreased.  The motion has increased.  The activity tolerances have increased but not desired levels.  I feel that the patient would benefit from continued therapy.          PT Signature: Bhavna Davis, PT  PT License #[id number]    Electronically signed by Bhavna Davis, PT, 23, 3:03 PM EDT    Manual Therapy:    14     mins  92992;  Therapeutic Exercise:    15/30     mins  89794;     Neuromuscular Alana:    0    mins  64381;    Therapeutic Activity:     10     mins  17034;    Ultrasound                     0 _  mins 24253  Iontophoresis              ______  Electrical Stimulation  __15____mins 26551    Timed Treatment:   39   mins   Total Treatment:    75  mins

## 2023-04-19 ENCOUNTER — TELEPHONE (OUTPATIENT)
Dept: NEUROLOGY | Facility: CLINIC | Age: 68
End: 2023-04-19

## 2023-04-27 ENCOUNTER — TREATMENT (OUTPATIENT)
Dept: PHYSICAL THERAPY | Facility: CLINIC | Age: 68
End: 2023-04-27
Payer: COMMERCIAL

## 2023-04-27 DIAGNOSIS — M25.512 ACUTE PAIN OF LEFT SHOULDER: ICD-10-CM

## 2023-04-27 DIAGNOSIS — Z98.890 S/P ARTHROSCOPY OF LEFT SHOULDER: ICD-10-CM

## 2023-04-27 DIAGNOSIS — Z96.612 H/O TOTAL SHOULDER REPLACEMENT, LEFT: ICD-10-CM

## 2023-04-27 DIAGNOSIS — R68.89 IMPAIRED FUNCTION OF UPPER EXTREMITY: Primary | ICD-10-CM

## 2023-04-27 NOTE — LETTER
MD Letter  [facility] Physical Therapy - [facility]  [address], [address]  [place] [address]  Patient: Tati Hanley   : 1955  Vickey Hair MD  Date of Initial Visit: Type: THERAPY  Noted: 2023  Today's Date: 2023  Patient seen for 12 sessions    Treatment has included: therapeutic exercise, neuromuscular re-education, manual therapy, therapeutic activity, electrical stimulation and cryotherapy    Subjective   Tati states that she had been making some progress since her arthroscopy.  However, she fell last week, not on her left shoulder, but feels that the fall has caused her to swell a bit and feels like she has taken a step backwards.  She states that her pain varies from 0-4/10 and is present only with movement.  She states that she is still not sleeping without pain interruption but is sleeping better than she was a few weeks ago.    Objective   Shoulder AROM/PROM - Flexion  132/163*,  Abduction 78/160*  ER 54/60*,  IR 70/80*  Strength - pain inhibition with all planes but has pat least 3+/5 strength with isometric testing  Sensation - intact  Palpation - very sensitive to touch at incision site  Special tests - negative for any instability in the shoulder  Activity tolerances - still not sleeping without pain terruption. She is able to perform all self care skills independently but has had to modify her techniques.       Assessment/Plan  Patient has demonstrated moderate improvement since the initiation of therapy.  The pain has decreased.  The motion has increased some.  The activity tolerances have increased but not to desired levels.  I feel that the patient would benefit from continued therapy.  If you have any questions concerning the care, please do not hesitate to contact me.          PT Signature: Bhavna Davis, PT  PT License #[id number]    Electronically signed by Bhavna Davis PT, 23, 2:33 PM EDT    Manual Therapy:    12     mins   90531;  Therapeutic Exercise:    25     mins  18357;     Neuromuscular Alana:    0    mins  16087;    Therapeutic Activity:     10     mins  16266;    Ultrasound                     0 _  mins 63380  Iontophoresis              ______  Electrical Stimulation  ______mins 63898    Timed Treatment:   47   mins   Total Treatment:     60   mins

## 2023-04-27 NOTE — PROGRESS NOTES
MD Letter  [facility] Physical Therapy - [facility]  [address], [address]  [place] [address]  Patient: Tati Hanley   : 1955  Vickey Hair MD  Date of Initial Visit: Type: THERAPY  Noted: 2023  Today's Date: 2023  Patient seen for 12 sessions    Treatment has included: therapeutic exercise, neuromuscular re-education, manual therapy, therapeutic activity, electrical stimulation and cryotherapy    Subjective   Tati states that she had been making some progress since her arthroscopy.  However, she fell last week, not on her left shoulder, but feels that the fall has caused her to swell a bit and feels like she has taken a step backwards.  She states that her pain varies from 0-4/10 and is present only with movement.  She states that she is still not sleeping without pain interruption but is sleeping better than she was a few weeks ago.    Objective   Shoulder AROM/PROM - Flexion  132/163*,  Abduction 78/160*  ER 54/60*,  IR 70/80*  Strength - pain inhibition with all planes but has pat least 3+/5 strength with isometric testing  Sensation - intact  Palpation - very sensitive to touch at incision site  Special tests - negative for any instability in the shoulder  Activity tolerances - still not sleeping without pain terruption. She is able to perform all self care skills independently but has had to modify her techniques.       Assessment/Plan  Patient has demonstrated moderate improvement since the initiation of therapy.  The pain has decreased.  The motion has increased some.  The activity tolerances have increased but not to desired levels.  I feel that the patient would benefit from continued therapy.  If you have any questions concerning the care, please do not hesitate to contact me.          PT Signature: Bhavna Davis, PT  PT License #[id number]    Electronically signed by Bhavna Davis PT, 23, 2:33 PM EDT    Manual Therapy:    12     mins   27676;  Therapeutic Exercise:    25     mins  37709;     Neuromuscular Alana:    0    mins  34048;    Therapeutic Activity:     10     mins  20049;    Ultrasound                     0 _  mins 82616  Iontophoresis              ______  Electrical Stimulation  ______mins 63759    Timed Treatment:   47   mins   Total Treatment:     60   mins

## 2023-05-04 ENCOUNTER — TREATMENT (OUTPATIENT)
Dept: PHYSICAL THERAPY | Facility: CLINIC | Age: 68
End: 2023-05-04
Payer: COMMERCIAL

## 2023-05-04 DIAGNOSIS — Z98.890 S/P ARTHROSCOPY OF LEFT SHOULDER: ICD-10-CM

## 2023-05-04 DIAGNOSIS — Z96.612 H/O TOTAL SHOULDER REPLACEMENT, LEFT: ICD-10-CM

## 2023-05-04 DIAGNOSIS — R68.89 IMPAIRED FUNCTION OF UPPER EXTREMITY: Primary | ICD-10-CM

## 2023-05-04 NOTE — PROGRESS NOTES
Physical Therapy Daily Treatment Note  [facility] Physical Therapy [facility]  [address], [address]  [place] [address]    Patient: Tati Hanley   : 1955  Referring practitioner: Vickey Hair MD  Today's Date: 2023  Patient seen for 13 sessions    Visit Diagnoses:    ICD-10-CM ICD-9-CM   1. Impaired function of upper extremity  R68.89 V49.5   2. S/P arthroscopy of left shoulder  Z98.890 V45.89   3. H/O total shoulder replacement, left  Z96.612 V43.61       Subjective   Tati Hanley reports: that she saw Dr Hair last week.  She states that he said that it may take up to 2 years to heal.  He feels that the causes of her problem is scar tissue and she should continue to work on it for another year.  If still having trouble he will need to do a RTSA. He wants her to continue PT as long as she is getting benefit from it.  Still not able to sleep a full night without pain.        Objective   Tenderness anterior joint line    See Exercise, Manual, and Modality Logs for complete treatment.     Patient Education:    Assessment/Plan  We will continue therapy once per week as long as she continues to benefit from the therapy.  Still having pain interrupting her sleep. A bit more funcitonal at home now with less pain.    Progress strengthening /stabilization /functional activity           Timed:  Manual Therapy:    13     mins  51072;  Therapeutic Exercise:    15     mins  05037;     Neuromuscular Alana:    0    mins  08593;    Therapeutic Activity:     10     mins  89160;     Ultrasound:      0     mins  17432;    Iontophoresis              __0_   mins  Dry Needling               _____   mins        Untimed:  Electrical Stimulation:     15    mins  29488 ( );  Mechanical Traction:             mins  63994;   Paraffin                       _____  mins     Timed Treatment:   38   mins   Total Treatment:     65   mins    Bhavna Davis PT  [place] License # [id number]  Physical  Therapist    Electronically signed by Bhavna Davis, PT, 05/04/23, 2:02 PM EDT

## 2023-05-09 ENCOUNTER — OFFICE VISIT (OUTPATIENT)
Dept: SPORTS MEDICINE | Facility: CLINIC | Age: 68
End: 2023-05-09
Payer: COMMERCIAL

## 2023-05-09 VITALS
BODY MASS INDEX: 19.63 KG/M2 | RESPIRATION RATE: 16 BRPM | WEIGHT: 115 LBS | HEIGHT: 64 IN | OXYGEN SATURATION: 97 % | HEART RATE: 59 BPM | DIASTOLIC BLOOD PRESSURE: 70 MMHG | SYSTOLIC BLOOD PRESSURE: 130 MMHG
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
## 2023-05-09 DIAGNOSIS — S93.401A MODERATE RIGHT ANKLE SPRAIN, INITIAL ENCOUNTER: ICD-10-CM

## 2023-05-09 DIAGNOSIS — M25.571 ACUTE RIGHT ANKLE PAIN: ICD-10-CM

## 2023-05-09 RX ORDER — OMEGA-3 FATTY ACIDS/FISH OIL 300-1000MG
CAPSULE ORAL
COMMUNITY

## 2023-05-09 RX ORDER — ESTRADIOL 0.1 MG/G
CREAM VAGINAL
COMMUNITY

## 2023-05-09 NOTE — PROGRESS NOTES
"Tati is a 67 y.o. year old female presents to [facility]    Chief Complaint   Patient presents with   • Ankle Pain     New eval for RT ankle pain s/p injury about 2 weeks ago, she was hiking and sustained a fall, went to ED in [place] for treatment, informed no fractures or subluxations - here for further evaluation and treatment        History of Present Illness  New patient, new problem.  Suffered inversion injury while hiking in [place] approximately 2 weeks ago.  She heard a \"crack\" and had difficulty bearing weight.  Was evaluated there including x-ray, told no fracture.  Was given Aircast though has had difficulty donning this.  She has had pain even ambulating around her house doing chores.  No previous injuries to this ankle.  Has tried ice, intermittent elevation.    I have reviewed the patient's medical, family, and social history in detail and updated the computerized patient record.    /70 (BP Location: Right arm, Patient Position: Sitting, Cuff Size: Adult)   Pulse 59   Resp 16   Ht 162.6 cm (64.02\")   Wt 52.2 kg (115 lb)   SpO2 97%   BMI 19.73 kg/m²      Physical Exam    Vital signs reviewed.   General: No acute distress.  Eyes: conjunctiva clear; pupils equally round and reactive  ENT: external ears atraumatic  CV: no peripheral edema  Resp: normal respiratory effort, no use of accessory muscles  Skin: no rashes or wounds; normal turgor  Psych: mood and affect appropriate; recent and remote memory intact  Neuro: sensation to light touch intact    MSK Exam  R ankle, foot: There is moderate soft tissue swelling along the lateral gutter.  Positive anterior drawer.  Soft tissue tenderness along the course of the ATFL, PT FL, CFL.  Full range of motion.    Right Ankle X-Ray  Indication: Pain  Views: AP, Lateral, Mortise    Findings:  No fracture  No bony lesion  Soft tissues normal  Normal joint spaces    No prior studies available for " comparison.               Diagnoses and all orders for this visit:    Fall, initial encounter    Acute right ankle pain  -     XR Ankle 3+ View Right    Moderate right ankle sprain, initial encounter    Other orders  -     Ibuprofen 200 MG capsule; Advil  -     Calcium Carbonate-Vit D-Min (CALCIUM 1200 PO); calcium  -     Multiple Vitamins-Minerals (MULTIVITAMIN ADULT EXTRA C PO); multivitamin  -     Omega 3 1000 MG capsule; Omega 3  -     Probiotic Product (PROBIOTIC-10 PO); Probiotic  -     estradiol (ESTRACE) 0.1 MG/GM vaginal cream; estradiol 0.01% (0.1 mg/gram) vaginal cream   USE 0.5 GRAM VAGINALLY DAILY      Moderate ankle sprain, suspect partial tear to one of the ankle ligaments.  Reassurance given that no interval bony changes noted on today's x-ray based on outside report to compare.  Fitted for ASO.  HEP given.  Follow-up in 3 weeks.      Follow Up   Return in about 3 weeks (around 5/30/2023) for Recheck.  Patient was given instructions and counseling regarding her condition or for health maintenance advice. Please see specific information pulled into the AVS if appropriate.     EMR Dragon/Transcription disclaimer:    Much of this encounter note is an electronic transcription/translation of spoken language to printed text.  The electronic translation of spoken language may permit erroneous, or at times, nonsensical words or phrases to be inadvertently transcribed.  Although I have reviewed the note for such errors some may still exist.

## 2023-05-15 ENCOUNTER — PATIENT ROUNDING (BHMG ONLY) (OUTPATIENT)
Dept: SPORTS MEDICINE | Facility: CLINIC | Age: 68
End: 2023-05-15
Payer: COMMERCIAL

## 2023-05-15 NOTE — PROGRESS NOTES
May 15, 2023    A [employer] Message has been sent to the patient for PATIENT ROUNDING with [facility]

## 2023-05-16 DIAGNOSIS — F41.9 ANXIETY: Primary | ICD-10-CM

## 2023-05-16 DIAGNOSIS — M54.2 NECK PAIN: ICD-10-CM

## 2023-05-16 NOTE — TELEPHONE ENCOUNTER
Caller: Tati Hanley    Relationship: Self    Best call back number:     Requested Prescriptions:   Requested Prescriptions     Pending Prescriptions Disp Refills   • sertraline (ZOLOFT) 25 MG tablet       Sig: Take 1 tablet by mouth Daily.      HYDROCODONE(NORCO)  7.5-325    Pharmacy where request should be sent:  [facility]  [address] [place] [address] [phone]    Last office visit with prescribing clinician: Visit date not found   Last telemedicine visit with prescribing clinician: Visit date not found   Next office visit with prescribing clinician: 8/1/2023     Additional details provided by patient:     Does the patient have less than a 3 day supply:  [x] Yes  [] No    Would you like a call back once the refill request has been completed: [x] Yes [] No    If the office needs to give you a call back, can they leave a voicemail: [x] Yes [] No    Marin Sue Rep   05/16/23 10:15 EDT

## 2023-05-17 RX ORDER — SERTRALINE HYDROCHLORIDE 25 MG/1
25 TABLET, FILM COATED ORAL DAILY
Qty: 30 TABLET | Refills: 2 | Status: SHIPPED | OUTPATIENT
Start: 2023-05-17

## 2023-05-17 NOTE — TELEPHONE ENCOUNTER
Rx Refill Note  Requested Prescriptions     Pending Prescriptions Disp Refills   • sertraline (ZOLOFT) 25 MG tablet       Sig: Take 1 tablet by mouth Daily.      Last office visit with prescribing clinician: 1/17/2023  Last labs: 8/9/2022  (Info found on Aprima)     Next office visit with prescribing clinician: 8/1/2023       Laura Barney MA  05/17/23, 11:56 EDT

## 2023-05-18 RX ORDER — HYDROCODONE BITARTRATE AND ACETAMINOPHEN 7.5; 325 MG/1; MG/1
1 TABLET ORAL EVERY 6 HOURS PRN
Qty: 50 TABLET | Refills: 0 | OUTPATIENT
Start: 2023-05-18

## 2023-05-18 NOTE — TELEPHONE ENCOUNTER
Rx Refill Note  Requested Prescriptions     Pending Prescriptions Disp Refills   • HYDROcodone-acetaminophen (NORCO) 7.5-325 MG per tablet 50 tablet 0     Sig: Take 1 tablet by mouth Every 6 (Six) Hours As Needed for Moderate Pain.     Signed Prescriptions Disp Refills   • sertraline (ZOLOFT) 25 MG tablet 30 tablet 2     Sig: Take 1 tablet by mouth Daily.     Authorizing Provider: NING PAZ      Last office visit with prescribing clinician: 1/17/2023  Last labs: 8/9/2022  (Info found on Aprima)     Next office visit with prescribing clinician: 8/1/2023       Laura Barney MA  05/18/23, 12:01 EDTRx Refill Note  Requested Prescriptions     Signed Prescriptions Disp Refills   • sertraline (ZOLOFT) 25 MG tablet 30 tablet 2     Sig: Take 1 tablet by mouth Daily.     Authorizing Provider: NING PAZ      Last office visit with prescribing clinician: Visit date not found   Last telemedicine visit with prescribing clinician: Visit date not found   Next office visit with prescribing clinician: 8/1/2023                         Would you like a call back once the refill request has been completed: [] Yes [] No    If the office needs to give you a call back, can they leave a voicemail: [] Yes [] No    Laura Barney MA  05/18/23, 11:59 EDT

## 2023-05-18 NOTE — TELEPHONE ENCOUNTER
Rx Refill Note  Requested Prescriptions     Signed Prescriptions Disp Refills   • sertraline (ZOLOFT) 25 MG tablet 30 tablet 2     Sig: Take 1 tablet by mouth Daily.     Authorizing Provider: NING PAZ      Last office visit with prescribing clinician: 1/17/2023  Last labs: 8/9/2022   (Info found on Aprima)    Next office visit with prescribing clinician: 8/1/2023       Laura Barney MA  05/18/23, 11:57 EDT

## 2023-05-18 NOTE — TELEPHONE ENCOUNTER
DELETE AFTER REVIEWING: Telephone encounter to be sent to the clinical pool     Caller: Tati Hanley    Relationship: Self    Best call back number: [phone]    What medication are you requesting: HYDROCODONE 7.5-325    [facility] #[id number] [facility] [address] [place] [address] AT [facility] [place] - [phone]  - [phone] FX  P: [phone]

## 2023-05-19 PROBLEM — N85.00 ABNORMAL UTERINE BLEEDING DUE TO ENDOMETRIAL HYPERPLASIA: Status: ACTIVE | Noted: 2019-09-30

## 2023-05-19 PROBLEM — N93.9 ABNORMAL UTERINE BLEEDING DUE TO ENDOMETRIAL HYPERPLASIA: Status: ACTIVE | Noted: 2019-09-30

## 2023-05-19 PROBLEM — I83.90 ASYMPTOMATIC RETICULAR VEINS 1 MM TO 3 MM IN DIAMETER: Status: ACTIVE | Noted: 2023-04-06

## 2023-05-19 PROBLEM — M85.80 OSTEOPENIA: Status: ACTIVE | Noted: 2019-09-30

## 2023-05-19 PROBLEM — M25.512 PAIN IN JOINT OF LEFT SHOULDER: Status: ACTIVE | Noted: 2022-09-26

## 2023-05-19 PROBLEM — IMO0002 DEGENERATION OF INTERVERTEBRAL DISC: Status: ACTIVE | Noted: 2019-09-30

## 2023-05-19 PROBLEM — R20.0 NUMBNESS: Status: ACTIVE | Noted: 2020-02-04

## 2023-05-19 PROBLEM — N84.0 ENDOMETRIAL POLYP: Status: ACTIVE | Noted: 2018-03-27

## 2023-05-25 ENCOUNTER — TREATMENT (OUTPATIENT)
Dept: PHYSICAL THERAPY | Facility: CLINIC | Age: 68
End: 2023-05-25
Payer: COMMERCIAL

## 2023-05-25 DIAGNOSIS — Z98.890 S/P ARTHROSCOPY OF LEFT SHOULDER: ICD-10-CM

## 2023-05-25 DIAGNOSIS — M25.512 ACUTE PAIN OF LEFT SHOULDER: ICD-10-CM

## 2023-05-25 DIAGNOSIS — Z96.612 H/O TOTAL SHOULDER REPLACEMENT, LEFT: ICD-10-CM

## 2023-05-25 DIAGNOSIS — R68.89 IMPAIRED FUNCTION OF UPPER EXTREMITY: Primary | ICD-10-CM

## 2023-05-25 PROCEDURE — 97140 MANUAL THERAPY 1/> REGIONS: CPT | Performed by: PHYSICAL THERAPIST

## 2023-05-25 PROCEDURE — 97110 THERAPEUTIC EXERCISES: CPT | Performed by: PHYSICAL THERAPIST

## 2023-05-25 NOTE — PROGRESS NOTES
Physical Therapy Daily Treatment Note  [facility] Physical Therapy [facility]  [address], [address]  [place] [address]    Patient: Tati Hanley   : 1955  Referring practitioner: Vickey Hair MD  Today's Date: 2023  Patient seen for 14 sessions    Visit Diagnoses:    ICD-10-CM ICD-9-CM   1. Impaired function of upper extremity  R68.89 V49.5   2. S/P arthroscopy of left shoulder  Z98.890 V45.89   3. H/O total shoulder replacement, left  Z96.612 V43.61   4. Acute pain of left shoulder  M25.512 719.41       Subjective   Tati Hanley reports: that she is still doing her therapy at home regularly.  At times she feels like it is better as she has less pain in general.  Some days her motion is quite limited. Pain in atnerior shoulder.  Still has trouble sleeping on that left side.      Objective   AROM - Flexion 138*, Abduction 96*,  ER 45*,  IR 70*  Thumb to T10 behind back    Tenderness in anterior joint line    See Exercise, Manual, and Modality Logs for complete treatment.     Patient Education: issued written copies of new exercises performed in the clinic    Assessment/Plan  Tati does have less shoulder pain in general but still has pain with elevation activities.  Able to tolerate new exercises today without increased pain but had considerable weakness in scapular stabilizers    Progress strengthening /stabilization /functional activity           Timed:  Manual Therapy:    14     mins  11412;  Therapeutic Exercise:    25/35     mins  64567;     Neuromuscular Alana:    0    mins  01027;    Therapeutic Activity:     0     mins  62454;     Ultrasound:      0     mins  53780;    Iontophoresis              __0_   mins  Dry Needling               _____   mins        Untimed:  Electrical Stimulation:     0    mins  69934 (MC );  Mechanical Traction:             mins  12739;   Paraffin                       _____  mins     Timed Treatment:   39   mins   Total  Treatment:     60   mins    Bhavna Davis, PT  [place] License # [id number]  Physical Therapist    Electronically signed by Bhavna Davis, PT, 05/25/23, 3:39 PM EDT

## 2023-06-01 ENCOUNTER — TREATMENT (OUTPATIENT)
Dept: PHYSICAL THERAPY | Facility: CLINIC | Age: 68
End: 2023-06-01

## 2023-06-01 DIAGNOSIS — Z98.890 S/P ARTHROSCOPY OF LEFT SHOULDER: ICD-10-CM

## 2023-06-01 DIAGNOSIS — R68.89 IMPAIRED FUNCTION OF UPPER EXTREMITY: Primary | ICD-10-CM

## 2023-06-01 DIAGNOSIS — Z96.612 H/O TOTAL SHOULDER REPLACEMENT, LEFT: ICD-10-CM

## 2023-06-01 NOTE — PROGRESS NOTES
Physical Therapy Daily Treatment Note  [facility] Physical Therapy [facility]  [address], [address]  [place] [address]    Patient: Tati Hanley   : 1955  Referring practitioner: Vickey Hair MD  Today's Date: 2023  Patient seen for 15 sessions    Visit Diagnoses:    ICD-10-CM ICD-9-CM   1. Impaired function of upper extremity  R68.89 V49.5   2. S/P arthroscopy of left shoulder  Z98.890 V45.89   3. H/O total shoulder replacement, left  Z96.612 V43.61       Subjective   Tati Hanley reports: compliance with her HEP.  Reports soreness after exercising.  Is sleeping better but not uninterrupted.  Taking tylenol and Ibu prn.  Has some biceps soreness but no sharp pain. In the biceps.  Occasional stabbing type pain in the anterior shoulder joint line.         Objective   Tenderness in anterior joint line area    See Exercise, Manual, and Modality Logs for complete treatment.     Patient Education: purpose of new exercises    Assessment/Plan  Francesca is slowly yet steadily increasing in the function of shoulder.  Able to lift light weights with left arm to above shoulder level    Progress strengthening /stabilization /functional activity  Reassess next visit         Timed:  Manual Therapy:    14     mins  84477;  Therapeutic Exercise:    15/25     mins  51717;     Neuromuscular Alana:    0    mins  84896;    Therapeutic Activity:     10     mins  69459;     Ultrasound:      0     mins  73349;    Iontophoresis              __0_   mins  Dry Needling               _____   mins        Untimed:  Electrical Stimulation:     0    mins  52406 ( );  Mechanical Traction:             mins  66513;   Paraffin                       _____  mins     Timed Treatment:   39   mins   Total Treatment:     60   mins    Bhavna Davis PT  [place] License # [id number]  Physical Therapist    Electronically signed by Bhavna Davis PT, 23, 2:41 PM EDT

## 2023-06-02 ENCOUNTER — OFFICE VISIT (OUTPATIENT)
Dept: FAMILY MEDICINE CLINIC | Facility: CLINIC | Age: 68
End: 2023-06-02

## 2023-06-02 VITALS
TEMPERATURE: 98.1 F | OXYGEN SATURATION: 97 % | HEIGHT: 63 IN | BODY MASS INDEX: 21.78 KG/M2 | WEIGHT: 122.9 LBS | SYSTOLIC BLOOD PRESSURE: 124 MMHG | RESPIRATION RATE: 16 BRPM | HEART RATE: 62 BPM | DIASTOLIC BLOOD PRESSURE: 80 MMHG

## 2023-06-02 DIAGNOSIS — M25.512 PAIN IN JOINT OF LEFT SHOULDER: ICD-10-CM

## 2023-06-02 DIAGNOSIS — M54.2 CERVICAL SPINE PAIN: Primary | ICD-10-CM

## 2023-06-02 RX ORDER — HYDROCODONE BITARTRATE AND ACETAMINOPHEN 7.5; 325 MG/1; MG/1
0.5 TABLET ORAL EVERY 6 HOURS PRN
Qty: 50 TABLET | Refills: 0 | Status: SHIPPED | OUTPATIENT
Start: 2023-06-02

## 2023-06-02 NOTE — PROGRESS NOTES
"Subjective     Tati Hanley is a 67 y.o. female who presents with   Chief Complaint   Patient presents with   • Degenerative Disc Disease     Hydrocodone refill       History of Present Illness     Chronic neck pain and has been in PT for about a year for her shoulder and that isn't helping her neck.  She's had two procedures and the surgeon wants to take the prosthesis out and start over but planning to give it time.She currently uses 1/2 of a Norco with one Advil with help, here for a refill.     Did have a bad sprain of the right ankle on the [employer] [address] in [place] and went to the ER there.  She's still recovering.              Review of Systems     Objective     /80 (BP Location: Right arm, Patient Position: Sitting, Cuff Size: Adult)   Pulse 62   Temp 98.1 °F (36.7 °C) (Oral)   Resp 16   Ht 160 cm (63\")   Wt 55.7 kg (122 lb 14.4 oz)   SpO2 97%   Breastfeeding No   BMI 21.77 kg/m²     Physical Exam  Constitutional:       Appearance: Normal appearance.   Musculoskeletal:         General: Swelling and tenderness (left trapezius) present.   Neurological:      Mental Status: She is alert.   Psychiatric:         Behavior: Behavior normal.         Thought Content: Thought content normal.         Procedures     Assessment & Plan   Diagnoses and all orders for this visit:    1. Cervical spine pain (Primary)  -     HYDROcodone-acetaminophen (Norco) 7.5-325 MG per tablet; Take 0.5 tablets by mouth Every 6 (Six) Hours As Needed for Moderate Pain.  Dispense: 50 tablet; Refill: 0    2. Pain in joint of left shoulder  Assessment & Plan:  Continue regular therapy.          Discussion    Patient Instructions   You have been given a controlled substance.  These drugs are addicting and must be used with caution.  Use the drug only when needed and avoid operating heavy machinery or driving when using the medication until you know how you respond.  Dispose of the medication properly when you no longer " need it.  I need to see you every 90 days, routine drug testing and [place] monitoring will be done as required by [place] law.  If 90 days passes, I can not renew the medication. I recommend you schedule now.                    Eloisa Epperson MD

## 2023-06-03 NOTE — PATIENT INSTRUCTIONS
You have been given a controlled substance.  These drugs are addicting and must be used with caution.  Use the drug only when needed and avoid operating heavy machinery or driving when using the medication until you know how you respond.  Dispose of the medication properly when you no longer need it.  I need to see you every 90 days, routine drug testing and [place] monitoring will be done as required by [place] law.  If 90 days passes, I can not renew the medication. I recommend you schedule now.

## 2023-06-07 NOTE — PROGRESS NOTES
Physical Therapy Daily Treatment Note    Visit Diagnoses:    ICD-10-CM ICD-9-CM   1. Impaired function of upper extremity  R68.89 V49.5   2. S/P arthroscopy of left shoulder  Z98.890 V45.89   3. H/O total shoulder replacement, left  Z96.612 V43.61   4. Status post total shoulder arthroplasty, left  Z96.612 V43.61       VISIT#: 4      Tati Hanley reports: MD checked to see if any infection present in existing repair so went in again for arthroscopy to debridement adhesions and check status of previous repair.  Position of the prosthesis was good, still awaiting results of biopsy for infection. She looked into the [facility] to find out about her biopsy. She only saw he three day culture and it was clear but she has not seen the 10 day culture. She stripped and made her bed just a little while ago and it caused her clavicle to have spasms.   Current Pain Level:    Sore 4/10; Worst:   7-8/10  Affected Area: L shoulder, anterior, distal clavicle  Quality of Pain: diffuse      Objective    Increased sets/reps of:  none   Increased resistance on:  none  Added to Program: none        See Exercise, Manual, and Modality Logs for complete treatment.     Patient Education: Pt was educated on exercise biomechanical correctness, intensity, and speed. Discussion on some basics of pain science (homunculus)  Education (role of homunculus)  during PROM    Assessment:  Pt range of motion limited in all planes motion especially ER. Pt somewhat guarded and sensitive to movement of her shoulder.  Diffuse pain present in her arm that may benefit from desensitization.  Pt will continue to benefit from skilled PT interventions to address current functional deficits and impairments.       Plan: Progress to/Continue with current program.       Timed:  Manual Therapy:            15_    mins  96479;  Ultrasound:                     0      mins  97832;   Therapeutic Exercise:    25     mins  04240;     Neuromuscular Alana:   0      mins  56644;    Therapeutic Activity:      0     mins  05762;      Iontophoresis              _0__   mins  Dry Needling               _0____   mins         Untimed:  Work Conditioning: __0__ mins 56796  Electrical Stimulation:    15    mins  38224 ( );  Mechanical Traction:       0        mins  82794;   Paraffin                       __0___  mins   Ice/Heat: __15__ mins      Timed Treatment:   40   mins   Total Treatment:     55   mins          ROSALIND Campbell License # [id number]  Physical Therapist Assistant       no

## 2023-06-09 ENCOUNTER — TREATMENT (OUTPATIENT)
Dept: PHYSICAL THERAPY | Facility: CLINIC | Age: 68
End: 2023-06-09
Payer: COMMERCIAL

## 2023-06-09 DIAGNOSIS — R68.89 IMPAIRED FUNCTION OF UPPER EXTREMITY: Primary | ICD-10-CM

## 2023-06-09 DIAGNOSIS — Z96.612 H/O TOTAL SHOULDER REPLACEMENT, LEFT: ICD-10-CM

## 2023-06-09 DIAGNOSIS — Z98.890 S/P ARTHROSCOPY OF LEFT SHOULDER: ICD-10-CM

## 2023-06-09 NOTE — PROGRESS NOTES
Physical Therapy Daily Treatment Note  [facility]  [address], [address]  [place] [address]    Patient: Tati Hanley   : 1955  Referring practitioner: Vickey Hair MD  Today's Date: 2023  Patient seen for 16 sessions    Visit Diagnoses:    ICD-10-CM ICD-9-CM   1. Impaired function of upper extremity  R68.89 V49.5   2. S/P arthroscopy of left shoulder  Z98.890 V45.89   3. H/O total shoulder replacement, left  Z96.612 V43.61       Subjective   Tati Hanley reports: that she has been having progressive right shoulder pain. No specific incident to cause the pain.  Hurts with lifting and any overhead activities      Objective   Right shoulder slightly limited in ER, abduction and IR but full flexion    Tenderness in the right biceps tendon    Functional strength in the arm    Positive speeds and Polanco Anthony tests    See Exercise, Manual, and Modality Logs for complete treatment.     Patient Education:    Assessment/Plan  Showing some signs of impingement in the right shoulder preventing some of the previously performed exercises.  Left shoulder pain has decreased slightly with some improved function of the shoulder.    Progress strengthening /stabilization /functional activity  Reassess next visit         Timed:  Manual Therapy:    15     mins  52802;  Therapeutic Exercise:    25     mins  98660;     Neuromuscular Alana:    0    mins  59373;    Therapeutic Activity:     15     mins  78203;   Examined and discussed right shoulder symptoms, instructed in treatment techniques for home  Ultrasound:      0     mins  04381;    Iontophoresis              __0_   mins  Dry Needling               _____   mins        Untimed:  Electrical Stimulation:     0    mins  29985 ( );  Mechanical Traction:             mins  09336;   Paraffin                       _____  mins     Timed Treatment:   55   mins   Total Treatment:     60   mins    Bhavna  KEVIN Davis PT  [place] License # [id number]  Physical Therapist    Electronically signed by Bhavna Davis, PT, 06/09/23, 2:38 PM EDT

## 2023-06-15 ENCOUNTER — TREATMENT (OUTPATIENT)
Dept: PHYSICAL THERAPY | Facility: CLINIC | Age: 68
End: 2023-06-15
Payer: COMMERCIAL

## 2023-06-15 DIAGNOSIS — R68.89 IMPAIRED FUNCTION OF UPPER EXTREMITY: Primary | ICD-10-CM

## 2023-06-15 DIAGNOSIS — M25.512 ACUTE PAIN OF LEFT SHOULDER: ICD-10-CM

## 2023-06-15 DIAGNOSIS — Z96.612 H/O TOTAL SHOULDER REPLACEMENT, LEFT: ICD-10-CM

## 2023-06-15 DIAGNOSIS — Z98.890 S/P ARTHROSCOPY OF LEFT SHOULDER: ICD-10-CM

## 2023-06-15 NOTE — PROGRESS NOTES
Physical Therapy Daily Treatment Note - Reassessment  [facility] Physical Therapy [facility]  [address], [address]  [place] [address]    Patient: Tati Hanley   : 1955  Referring practitioner: Vickey Hair MD  Today's Date: 6/15/2023  Patient seen for 17 sessions    Visit Diagnoses:    ICD-10-CM ICD-9-CM   1. Impaired function of upper extremity  R68.89 V49.5   2. S/P arthroscopy of left shoulder  Z98.890 V45.89   3. H/O total shoulder replacement, left  Z96.612 V43.61   4. Acute pain of left shoulder  M25.512 719.41       Subjective   Tati Hanley reports: that she will be seeing Dr Jackson in July for a second opinion on the left shoulder. Notes that the left shoulder has been more sore than the left one lately.      Objective   Shoulder AROM - flexion 148*,  Abduction 96*, ER 50*,  IR  78*    Shoulder strength - flexion 4/5, abduciton 4/5 limited by pain    See Exercise, Manual, and Modality Logs for complete treatment.     Patient Education:    Assessment/Plan  Now having some right shoulder pain preventing some bilateral exercises.  Shoulder motion has increased in past couple of weeks but still remains painful with active use.     Progress strengthening /stabilization /functional activity           Timed:  Manual Therapy:    14     mins  11472;  Therapeutic Exercise:    15/35     mins  75995;     Neuromuscular Alana:    0    mins  27470;    Therapeutic Activity:     10     mins  10439;     Ultrasound:      0     mins  60097;    Iontophoresis              __0_   mins  Dry Needling               _____   mins        Untimed:  Electrical Stimulation:     0    mins  66574 ( );  Mechanical Traction:             mins  88158;   Paraffin                       _____  mins     Timed Treatment:   39   mins   Total Treatment:     65   mins    Bhavna Davis PT  [place] License # [id number]  Physical Therapist    Electronically signed by Bhavna Davis PT, 06/15/23, 2:40 PM  EDT

## 2023-07-26 ENCOUNTER — TREATMENT (OUTPATIENT)
Dept: PHYSICAL THERAPY | Facility: CLINIC | Age: 68
End: 2023-07-26
Payer: COMMERCIAL

## 2023-07-26 DIAGNOSIS — R68.89 IMPAIRED FUNCTION OF UPPER EXTREMITY: Primary | ICD-10-CM

## 2023-07-26 DIAGNOSIS — Z98.890 S/P ARTHROSCOPY OF LEFT SHOULDER: ICD-10-CM

## 2023-07-26 DIAGNOSIS — Z96.612 H/O TOTAL SHOULDER REPLACEMENT, LEFT: ICD-10-CM

## 2023-07-26 NOTE — PROGRESS NOTES
Physical Therapy Daily Treatment Note  [facility]  [address], [address]  [place] [address]    Patient: Tati Hanley   : 1955  Referring practitioner: Vickey Hair MD  Today's Date: 2023  Patient seen for 21 sessions    Visit Diagnoses:    ICD-10-CM ICD-9-CM   1. Impaired function of upper extremity  R68.89 V49.5   2. S/P arthroscopy of left shoulder  Z98.890 V45.89   3. H/O total shoulder replacement, left  Z96.612 V43.61       Subjective   Tati Hanley reports: she saw Dr Jackson last week and he said that some of the issues were AC joint arthritis, adhesive capsulitis and potential RTC involvement.   States that he thinks that he she may need a reverse TSR.  Tati thinks that she wants to wait for another 6-12 months      Objective     Passive flexion 160*, scaption 140*    See Exercise, Manual, and Modality Logs for complete treatment.     Patient Education: issued written copy of scapular retraction and ER exercise.  Also stressed the importance of maintaining her passive mobility to allow for function when strength increases    Assessment/Plan  Tati has good passive motion in many planes but her active motion is still quite limited due to pain and weakness.  Needs additional strengthening     Progress strengthening /stabilization /functional activity           Timed:  Manual Therapy:    14     mins  56551;  Therapeutic Exercise:    15/30     mins  75517;     Neuromuscular Alana:    0    mins  22369;    Therapeutic Activity:     15     mins  90764;     Ultrasound:      0     mins  56380;    Iontophoresis              __0_   mins  Dry Needling               _____   mins        Untimed:  Electrical Stimulation:     0    mins  01579 ( );  Mechanical Traction:             mins  25293;   Paraffin                       _____  mins     Timed Treatment:   44   mins   Total Treatment:     65   mins    Bhavna Davis, PT  [place]  License # [id number]  Physical Therapist    Electronically signed by Bhavna Davis, PT, 07/26/23, 3:06 PM EDT

## 2023-08-01 ENCOUNTER — OFFICE VISIT (OUTPATIENT)
Dept: FAMILY MEDICINE CLINIC | Facility: CLINIC | Age: 68
End: 2023-08-01
Payer: COMMERCIAL

## 2023-08-01 VITALS
SYSTOLIC BLOOD PRESSURE: 133 MMHG | BODY MASS INDEX: 20.66 KG/M2 | HEART RATE: 57 BPM | OXYGEN SATURATION: 98 % | HEIGHT: 64 IN | DIASTOLIC BLOOD PRESSURE: 86 MMHG | WEIGHT: 121 LBS

## 2023-08-01 DIAGNOSIS — M25.512 PAIN IN JOINT OF LEFT SHOULDER: ICD-10-CM

## 2023-08-01 DIAGNOSIS — Z00.00 ANNUAL PHYSICAL EXAM: Primary | ICD-10-CM

## 2023-08-01 DIAGNOSIS — C43.9 MALIGNANT MELANOMA, UNSPECIFIED SITE: ICD-10-CM

## 2023-08-01 DIAGNOSIS — M50.90 CERVICAL DISC DISEASE: ICD-10-CM

## 2023-08-01 DIAGNOSIS — E04.1 THYROID NODULE: ICD-10-CM

## 2023-08-01 DIAGNOSIS — M85.80 OSTEOPENIA, UNSPECIFIED LOCATION: ICD-10-CM

## 2023-08-01 DIAGNOSIS — Z13.220 NEED FOR LIPID SCREENING: ICD-10-CM

## 2023-08-01 PROBLEM — B02.21 RAMSAY HUNT AURICULAR SYNDROME: Status: ACTIVE | Noted: 2023-08-01

## 2023-08-01 PROCEDURE — 99397 PER PM REEVAL EST PAT 65+ YR: CPT | Performed by: FAMILY MEDICINE

## 2023-08-01 PROCEDURE — 99213 OFFICE O/P EST LOW 20 MIN: CPT | Performed by: FAMILY MEDICINE

## 2023-08-01 RX ORDER — HYDROCODONE BITARTRATE AND ACETAMINOPHEN 5; 325 MG/1; MG/1
1 TABLET ORAL EVERY 6 HOURS PRN
COMMUNITY
Start: 2023-07-20

## 2023-08-02 DIAGNOSIS — F41.9 ANXIETY: ICD-10-CM

## 2023-08-02 RX ORDER — TIZANIDINE 2 MG/1
2 TABLET ORAL EVERY 8 HOURS PRN
Qty: 60 TABLET | Refills: 1 | Status: SHIPPED | OUTPATIENT
Start: 2023-08-02

## 2023-08-02 RX ORDER — SERTRALINE HYDROCHLORIDE 25 MG/1
25 TABLET, FILM COATED ORAL DAILY
Qty: 90 TABLET | Refills: 1 | Status: SHIPPED | OUTPATIENT
Start: 2023-08-02

## 2023-08-07 ENCOUNTER — CLINICAL SUPPORT (OUTPATIENT)
Dept: FAMILY MEDICINE CLINIC | Facility: CLINIC | Age: 68
End: 2023-08-07
Payer: COMMERCIAL

## 2023-08-07 ENCOUNTER — OFFICE VISIT (OUTPATIENT)
Dept: ORTHOPEDIC SURGERY | Facility: CLINIC | Age: 68
End: 2023-08-07
Payer: COMMERCIAL

## 2023-08-07 VITALS — BODY MASS INDEX: 20.54 KG/M2 | TEMPERATURE: 97.7 F | WEIGHT: 120.3 LBS | HEIGHT: 64 IN

## 2023-08-07 DIAGNOSIS — M25.511 RIGHT SHOULDER PAIN, UNSPECIFIED CHRONICITY: Primary | ICD-10-CM

## 2023-08-07 DIAGNOSIS — E04.1 THYROID NODULE: ICD-10-CM

## 2023-08-07 DIAGNOSIS — Z13.220 NEED FOR LIPID SCREENING: ICD-10-CM

## 2023-08-07 PROCEDURE — 36415 COLL VENOUS BLD VENIPUNCTURE: CPT | Performed by: FAMILY MEDICINE

## 2023-08-07 PROCEDURE — 99213 OFFICE O/P EST LOW 20 MIN: CPT | Performed by: ORTHOPAEDIC SURGERY

## 2023-08-07 NOTE — PROGRESS NOTES
Patient:Tati Hanley    YOB: 1955    Medical Record Number:[id number]    Chief Complaints:  Right shoulder pain    History of Present Illness:     67 y.o. female patient who presents for her right shoulder.  This is a new complaint.  She says it started bothering her 6 or 7 months ago.  She does not really recall any specific injury.  Pain is moderate, intermittent and typically stabbing in character.  Pain is worse with certain reaching and lifting movements.  She obviously had a difficult time with her left shoulder replacement.  She is still struggling.  She is very concerned that she has the same problem in her right shoulder.  She has been going to therapy but they have not made a lot of progress.  She has a trip to the [facility] coming up in just about a month.    Allergies   Allergen Reactions    Amoxicillin Hives    Codeine Hives    Erythromycin Nausea And Vomiting and Other (See Comments)     Headaches  RASH      Meloxicam Other (See Comments)     Very tired    Nsaids Hallucinations     Hallucinations with volteran (meloxiam, celebrex and volteran)     Penicillins Hives         Current Outpatient Medications:     ESTROGEL 0.75 MG/1.25 GM (0.06%) topical gel, 1.25 g Every Other Day., Disp: , Rfl:     Ibuprofen 200 MG capsule, Advil, Disp: , Rfl:     Multiple Vitamins-Minerals (MULTIVITAMIN ADULT EXTRA C PO), , Disp: , Rfl:     progesterone (PROMETRIUM) 100 MG capsule, Take 1 capsule by mouth Daily., Disp: , Rfl:     sertraline (ZOLOFT) 25 MG tablet, Take 1 tablet by mouth Daily., Disp: 90 tablet, Rfl: 1    tiZANidine (ZANAFLEX) 2 MG tablet, Take 1 tablet by mouth Every 8 (Eight) Hours As Needed for Muscle Spasms., Disp: 60 tablet, Rfl: 1    HYDROcodone-acetaminophen (NORCO) 5-325 MG per tablet, Take 1 tablet by mouth Every 6 (Six) Hours As Needed. for pain, Disp: , Rfl:     Past Medical History:   Diagnosis Date    Ankle sprain 4/23/23 hiking accident    Anxiety     Asymptomatic  reticular veins 1 mm to 3 mm in diameter 04/06/2023    Cervical disc disorder with myelopathy, unspecified cervical region 01/17/2023    Cholelithiasis Had gallbladder out 2019    DDD (degenerative disc disease), cervical     C4-7    Foot sprain 4/23/23 hiking accident    Hyperlipidemia     Hypothyroidism not out of range    Injury of neck     Limited range of motion of shoulder     LEFT    Low back pain multi-level degenerative disc disease    Malignant melanoma     1986, thigh, fully excised     LEFT    Migraine aura without headache 11/23/2022    Osteoarthritis     Osteopenia     Osteopenia 09/30/2019    Patent foramen ovale 01/17/2023    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    Scoliosis Diagnosed by orthopedic surgeon 1988    Shoulder injury Total shoulder replacement 2022 exploratory orthroscopy 2023    Shoulder pain, left 01/17/2023    Stroke August 2022 possible TIA    Syncope Pre-syncope 2011    Thyroid nodule 11/23/2022    TIA (transient ischemic attack) 08/2022    POSSIBLE    Vision changes 01/17/2023    Visual impairment glasses for close and far       Past Surgical History:   Procedure Laterality Date    APPENDECTOMY  1970    CHOLECYSTECTOMY  2019/2020    COLONOSCOPY  2018    ENDOSCOPY  12/02/2019    and Colonoscopy    FOOT SURGERY Right 2017    FUSION OF GREAT TOE    GALLBLADDER SURGERY  2018    JOINT REPLACEMENT  foot/shoulder    MAMMO DIAGNOSTIC DIGITAL TOMOSYNTHESIS UNILATERAL Bilateral 09/23/2019    SHOULDER ARTHROSCOPY Left 02/16/2023    Procedure: LEFT SHOULDER ARTHROSCOPY BIOPSY FOR INFECTION NO PRE-OPERATIVE ANTIBOTICS;  Surgeon: Vickey Hair MD;  Location: [facility];  Service: Orthopedics;  Laterality: Left;    SHOULDER ARTHROSCOPY      02/2023    TOTAL SHOULDER ARTHROPLASTY Left 06/21/2022    TRIGGER POINT INJECTION  2020 [facility]    UTERINE FIBROID SURGERY  2018    WISDOM TOOTH EXTRACTION  1986       Social History     Occupational History    Occupation:  pyscotherapist   Tobacco Use    Smoking status: Never     Passive exposure: Never    Smokeless tobacco: Never   Vaping Use    Vaping Use: Never used   Substance and Sexual Activity    Alcohol use: Not Currently     Comment: A glass of wine about once a month, if that.    Drug use: Never    Sexual activity: Yes     Partners: Male     Birth control/protection: Post-menopausal     Comment: Menopause age 57      Social History     Social History Narrative    Pt drinks 2 cups of coffee daily.         Family History   Problem Relation Age of Onset    Hypertension Mother     Arthritis Mother     Thyroid disease Mother     Broken bones Mother         fall broke her hand    Osteoporosis Mother     Hypertension Brother     Heart disease Brother     Stroke Brother     Hypertension Maternal Grandmother     Arthritis Maternal Grandmother     Hyperlipidemia Maternal Grandfather     Heart attack Paternal Grandfather     Hearing loss Father     Vision loss Father         macular degeneration    Stroke Paternal Grandmother     Malig Hyperthermia Neg Hx        Review of Systems:      Constitutional: Denies fever, shaking or chills   Eyes: Denies change in visual acuity   HEENT: Denies nasal congestion or sore throat   Respiratory: Denies cough or shortness of breath   Cardiovascular: Denies chest pain or edema  Endocrine: Denies tremors, palpitations, intolerance of heat or cold, polyuria, polydipsia.  GI: Denies abdominal pain, nausea, vomiting, bloody stools or diarrhea  : Denies frequency, urgency, incontinence, retention, or nocturia.  Musculoskeletal: Denies numbness, tingling or loss of motor function except as above  Integument: Denies rash, lesion or ulceration   Neurologic: Denies headache or focal weakness, deficits  Heme: Denies spontaneous or excessive bleeding, epistaxis, hematuria, melena, fatigue, enlarged or tender lymph nodes.      All other pertinent positives and negatives as noted above in HPI.    Physical  "Exam:67 y.o. female  Vitals:    08/07/23 1600   Temp: 97.7 øF (36.5 øC)   Weight: 54.6 kg (120 lb 4.8 oz)   Height: 162.6 cm (64\")     General:  Patient is awake and alert.  Appears in no acute distress or discomfort.    Psych:  Affect and demeanor are appropriate.    Extremities:  Right shoulder is examined.  Skin is benign.  No gross abnormalities on inspection including any atrophy, swellings, or masses.  No palpable masses or adenopathy. No focal tenderness.  Full shoulder motion.  No evident instability or apprehension.  Positive Neer and Polanco manuevers.  Good strength in the rotator cuff, deltoid, biceps, triceps, and .  Intact sensation throughout the arm.  Palpable radial pulse.  Good skin turgor.  Brisk cap refill.     Imaging:  AP, scapular Y, and axillary views of the right shoulder are ordered by myself and reviewed to evaluate the patient's complaint.  No comparison films are immediately available.  The x-rays show just mild glenohumeral arthritis.  There are no obvious acute abnormalities, lesions, masses, significant degenerative changes, or other concerning findings.  The acromiohumeral interval is normal.  Glenoid version appears normal as well.     Assessment/Plan:  Right shoulder impingement    First of all, I reassured her that I do not see anything on her x-rays that would suggest that she is anywhere close to needing a shoulder replacement.  Exam suggest that she just has some impingement and bursitis.  I expect we could probably get her feeling better with the injection.  She was very relieved to hear this news.  She would very much like to get an injection but she would like to wait until she is a little closer to her upcoming trip.  We are going to schedule her an appointment to come back in about 2 weeks or so for purposes of the injection.    Vickey Jackson MD    08/07/2023    "

## 2023-08-07 NOTE — PROGRESS NOTES
Venipuncture Blood Specimen Collection  Venipuncture performed in Left Arm by Jamel Steel CMA with good hemostasis. Patient tolerated the procedure well without complications.   08/07/23   Jamel Steel CMA

## 2023-08-08 ENCOUNTER — TREATMENT (OUTPATIENT)
Dept: PHYSICAL THERAPY | Facility: CLINIC | Age: 68
End: 2023-08-08
Payer: COMMERCIAL

## 2023-08-08 DIAGNOSIS — Z96.612 H/O TOTAL SHOULDER REPLACEMENT, LEFT: ICD-10-CM

## 2023-08-08 DIAGNOSIS — R68.89 IMPAIRED FUNCTION OF UPPER EXTREMITY: Primary | ICD-10-CM

## 2023-08-08 DIAGNOSIS — M25.512 ACUTE PAIN OF LEFT SHOULDER: ICD-10-CM

## 2023-08-08 DIAGNOSIS — Z98.890 S/P ARTHROSCOPY OF LEFT SHOULDER: ICD-10-CM

## 2023-08-08 LAB
ALBUMIN SERPL-MCNC: 4.8 G/DL (ref 3.9–4.9)
ALBUMIN/GLOB SERPL: 2.4 {RATIO} (ref 1.2–2.2)
ALP SERPL-CCNC: 51 IU/L (ref 44–121)
ALT SERPL-CCNC: 13 IU/L (ref 0–32)
AST SERPL-CCNC: 20 IU/L (ref 0–40)
BILIRUB SERPL-MCNC: 0.6 MG/DL (ref 0–1.2)
BUN SERPL-MCNC: 14 MG/DL (ref 8–27)
BUN/CREAT SERPL: 18 (ref 12–28)
CALCIUM SERPL-MCNC: 9.4 MG/DL (ref 8.7–10.3)
CHLORIDE SERPL-SCNC: 105 MMOL/L (ref 96–106)
CHOLEST SERPL-MCNC: 301 MG/DL (ref 100–199)
CO2 SERPL-SCNC: 21 MMOL/L (ref 20–29)
CREAT SERPL-MCNC: 0.77 MG/DL (ref 0.57–1)
EGFRCR SERPLBLD CKD-EPI 2021: 86 ML/MIN/1.73
GLOBULIN SER CALC-MCNC: 2 G/DL (ref 1.5–4.5)
GLUCOSE SERPL-MCNC: 87 MG/DL (ref 70–99)
HDLC SERPL-MCNC: 88 MG/DL
LABORATORY COMMENT REPORT: ABNORMAL
LDLC SERPL CALC-MCNC: 198 MG/DL (ref 0–99)
POTASSIUM SERPL-SCNC: 4.1 MMOL/L (ref 3.5–5.2)
PROT SERPL-MCNC: 6.8 G/DL (ref 6–8.5)
SODIUM SERPL-SCNC: 140 MMOL/L (ref 134–144)
TRIGL SERPL-MCNC: 92 MG/DL (ref 0–149)
TSH SERPL DL<=0.005 MIU/L-ACNC: 1.55 UIU/ML (ref 0.45–4.5)
VLDLC SERPL CALC-MCNC: 15 MG/DL (ref 5–40)

## 2023-08-08 NOTE — PROGRESS NOTES
Physical Therapy Daily Treatment Note  [facility] Therapy [facility]  [address], [address]  [place] [address]    Patient: Tati Hanley   : 1955  Referring practitioner: Vickey Hair MD  Today's Date: 2023  Patient seen for 22 sessions    Visit Diagnoses:    ICD-10-CM ICD-9-CM   1. Impaired function of upper extremity  R68.89 V49.5   2. S/P arthroscopy of left shoulder  Z98.890 V45.89   3. H/O total shoulder replacement, left  Z96.612 V43.61   4. Acute pain of left shoulder  M25.512 719.41       Subjective   Tati Hanley reports: that she saw Dr Trevino to evaluate her right shoulder.  He felt like she had some bursitis in the shoulder and would benefit from an injection done.  She will have that done next month. Her left shoulder is a bit sore today but notes that she is still doing her HEP.       Objective   Active shoulder flexion - 136*    See Exercise, Manual, and Modality Logs for complete treatment.     Patient Education: cont HEP    Assessment/Plan  Tati is slowing improving in her range and function of the left shoulder.  She is reaching for items at home now with less hesitation.  Still avoiding exercises that stress the right shoulder due to bursitis.     Progress strengthening /stabilization /functional activity           Timed:  Manual Therapy:    15     mins  92519;  Therapeutic Exercise:    10     mins  31614;     Neuromuscular Alana:    0    mins  21247;    Therapeutic Activity:     15     mins  54831;     Ultrasound:      0     mins  40161;    Iontophoresis              __0_   mins  Dry Needling               _____   mins        Untimed:  Electrical Stimulation:     0    mins  56265 ( );  Mechanical Traction:             mins  84641;   Paraffin                       _____  mins     Timed Treatment:   40   mins   Total Treatment:     55   mins    Bhavna Davis, PT  [place] License # [id number]  Physical Therapist    Electronically signed  by Bhavna Davis, PT, 08/08/23, 2:09 PM EDT

## 2023-08-17 ENCOUNTER — TREATMENT (OUTPATIENT)
Dept: PHYSICAL THERAPY | Facility: CLINIC | Age: 68
End: 2023-08-17
Payer: COMMERCIAL

## 2023-08-17 DIAGNOSIS — Z98.890 S/P ARTHROSCOPY OF LEFT SHOULDER: ICD-10-CM

## 2023-08-17 DIAGNOSIS — Z96.612 H/O TOTAL SHOULDER REPLACEMENT, LEFT: ICD-10-CM

## 2023-08-17 DIAGNOSIS — R68.89 IMPAIRED FUNCTION OF UPPER EXTREMITY: Primary | ICD-10-CM

## 2023-08-17 NOTE — PROGRESS NOTES
Physical Therapy Daily Treatment Note - Reassessment  [facility] Physical Therapy [facility]  [address], [address]  [place] [id number]    Patient: Tati Hanley   : 1955  Referring practitioner: Vickey Hair MD  Today's Date: 2023  Patient seen for 23 sessions    Visit Diagnoses:    ICD-10-CM ICD-9-CM   1. Impaired function of upper extremity  R68.89 V49.5   2. S/P arthroscopy of left shoulder  Z98.890 V45.89   3. H/O total shoulder replacement, left  Z96.612 V43.61       Subjective   Tati Hanley reports: that she has been compliant with her HEP.  Having more trouble with the right shoulder  than the left now.  States that she was able to canoe this weekend without significant pain in the shoulder.  Feels like her functional use of the keeps increasing.  Still waking up with pain but not sure if the pain is truly waking her or not.      Objective   Active flexion 144*,  abduction 81*, IR - thumb to T8    Passive flexion 152*, abduction 156*  Shoulder strength - flexion 4/5, extension 4+/5, abduction 3+/5     See Exercise, Manual, and Modality Logs for complete treatment.     Patient Education: continue stretching and work on shoulder depression to further engage lower trap and latissimus    Assessment/Plan  Tati has steadily increased her active shoulder flexion and internal rotation.  She still has considerable trouble with abduction.  Cervical symptoms prevent much prone exercise as well as wall slides.  Shoulder pain has decreased in general but still quite painful at end ranges of motion     Progress strengthening /stabilization /functional activity           Timed:  Manual Therapy:    14     mins  32835;  Therapeutic Exercise:    15/25     mins  07925;     Neuromuscular Alana:    0    mins  17833;    Therapeutic Activity:     10     mins  77866;     Ultrasound:      0     mins  42839;    Iontophoresis              __0_   mins  Dry Needling                _____   mins        Untimed:  Electrical Stimulation:     0    mins  87970 ( );  Mechanical Traction:             mins  68384;   Paraffin                       _____  mins     Timed Treatment:   39   mins   Total Treatment:     55   mins    Bhavna Davis, PT  [place] License # [id number]  Physical Therapist    Electronically signed by Bhavna Davis PT, 08/17/23, 3:58 PM EDT

## 2023-08-21 DIAGNOSIS — M54.2 CERVICAL SPINE PAIN: ICD-10-CM

## 2023-08-21 RX ORDER — HYDROCODONE BITARTRATE AND ACETAMINOPHEN 7.5; 325 MG/1; MG/1
0.5 TABLET ORAL EVERY 6 HOURS PRN
Qty: 50 TABLET | Refills: 0 | Status: SHIPPED | OUTPATIENT
Start: 2023-08-24

## 2023-08-22 ENCOUNTER — TREATMENT (OUTPATIENT)
Dept: PHYSICAL THERAPY | Facility: CLINIC | Age: 68
End: 2023-08-22
Payer: COMMERCIAL

## 2023-08-22 DIAGNOSIS — Z98.890 S/P ARTHROSCOPY OF LEFT SHOULDER: ICD-10-CM

## 2023-08-22 DIAGNOSIS — Z96.612 H/O TOTAL SHOULDER REPLACEMENT, LEFT: ICD-10-CM

## 2023-08-22 DIAGNOSIS — R68.89 IMPAIRED FUNCTION OF UPPER EXTREMITY: Primary | ICD-10-CM

## 2023-08-22 NOTE — PROGRESS NOTES
Physical Therapy Daily Treatment Note  [facility]  [address], [address]  [place] [address]    Patient: Tati Hanley   : 1955  Referring practitioner: Vickey Hair MD  Today's Date: 2023  Patient seen for 24 sessions    Visit Diagnoses:    ICD-10-CM ICD-9-CM   1. Impaired function of upper extremity  R68.89 V49.5   2. S/P arthroscopy of left shoulder  Z98.890 V45.89   3. H/O total shoulder replacement, left  Z96.612 V43.61       Subjective   Tati Hanley reports: that she was sore than usual after her last session.  Had some biceps pain  as well as some cervical pain.  Still not fully resolved from the flare up.       Objective   Significant increase in tone in left upper and cervical PVM    Tenderness in anterior shoulder and biceps tendon and muscle belly     See Exercise, Manual, and Modality Logs for complete treatment.     Patient Education: start planks at home 4x15s and progress to 60 seconds.  Add hands behind head stretch while in bed for stretch into ER    Assessment/Plan  Tati has had increased pain the past few days as we pushed her range a bit more last session.  Flare up of chronic cervical symptoms.  Very eager to improve and return to full yoga work out but has limited pain tolerance    Progress strengthening /stabilization /functional activity           Timed:  Manual Therapy:    10     mins  56375;  Therapeutic Exercise:    15     mins  65339;     Neuromuscular Alana:    10    mins  03743;    Therapeutic Activity:     10     mins  94178;     Ultrasound:      0     mins  42926;    Iontophoresis              __0_   mins  Dry Needling               _____   mins        Untimed:  Electrical Stimulation:     0    mins  12591 ( );  Mechanical Traction:             mins  11975;   Paraffin                       _____  mins     Timed Treatment:   45   mins   Total Treatment:     50   mins    Bhavna Davis, PT  [place]  License # [id number]  Physical Therapist    Electronically signed by Bhavna Davis, PT, 08/22/23, 2:09 PM EDT

## 2023-08-29 ENCOUNTER — TELEPHONE (OUTPATIENT)
Dept: ORTHOPEDIC SURGERY | Facility: CLINIC | Age: 68
End: 2023-08-29
Payer: COMMERCIAL

## 2023-08-29 NOTE — TELEPHONE ENCOUNTER
UNABLE TO WT      Caller: ARIEL HELM     Relationship to patient: PATIENT     Best call back number: [phone]    Chief complaint: RIGHT SHOULDER     Type of visit: INJECTION     Requested date: PATIENT WOULD LIKE TO PUSH THIS INJECTION OUT TIL OCTOBER. PATIENT IS REQUESTING THIS APPT BE ON A MONDAY OR THURSDAY.      If rescheduling, when is the original appointment: 09.13.23 AT 9:10 AM      Additional notes:PATIENT CALLED TO RESCHEDULE HER INJECTION THAT IS SCHEDULED WITH DR. GRIFFIN ON 09.13.23. PATIENT HAD RECENT ORAL SURGERY AND WOULD LIKE TO RESCHEDULE HER INJECTION FOR SOMETIME IN OCTOBER. PATIENT IS REQUESTING TO SEE ONLY DR. GRIFFIN FOR THIS INJECTION & IS OPEN TO GOING TO EITHER LOCATION AS LONG AS IT'S ON A MONDAY OR THURSDAY. THANK YOU!

## 2023-08-29 NOTE — TELEPHONE ENCOUNTER
UNABLE TO WT     PT CALLED IN REGARDING THIS APPT TO SPEAK WITH [facility] , PLEASE CALL BACK AT PH# [phone].

## 2023-08-29 NOTE — TELEPHONE ENCOUNTER
Caller: PATIENT    Relationship to patient: SELF     Best call back number: [phone]    Chief complaint: RIGHT SHOULDER PAIN    Type of visit: INJECTION     Requested date: PATIENT WOULD LIKE TO PUSH THIS INJECTION OUT TIL OCTOBER. PATIENT IS REQUESTING THIS APPT BE ON A MONDAY OR THURSDAY.    If rescheduling, when is the original appointment:  09.13.23 AT 9:10 AM    Additional notes: PATIENT CALLED TO RESCHEDULE HER INJECTION THAT IS SCHEDULED WITH DR. GRIFFIN ON 09.13.23. PATIENT HAD RECENT ORAL SURGERY AND WOULD LIKE TO RESCHEDULE HER INJECTION FOR SOMETIME IN OCTOBER. PATIENT IS REQUESTING TO SEE ONLY DR. GRIFFIN FOR THIS INJECTION & IS OPEN TO GOING TO EITHER LOCATION AS LONG AS IT'S ON A MONDAY OR THURSDAY. THANK YOU!

## 2023-09-07 ENCOUNTER — TREATMENT (OUTPATIENT)
Dept: PHYSICAL THERAPY | Facility: CLINIC | Age: 68
End: 2023-09-07
Payer: COMMERCIAL

## 2023-09-07 DIAGNOSIS — M25.512 ACUTE PAIN OF LEFT SHOULDER: ICD-10-CM

## 2023-09-07 DIAGNOSIS — R68.89 IMPAIRED FUNCTION OF UPPER EXTREMITY: Primary | ICD-10-CM

## 2023-09-07 DIAGNOSIS — Z98.890 S/P ARTHROSCOPY OF LEFT SHOULDER: ICD-10-CM

## 2023-09-07 DIAGNOSIS — Z96.612 H/O TOTAL SHOULDER REPLACEMENT, LEFT: ICD-10-CM

## 2023-09-07 NOTE — PROGRESS NOTES
Physical Therapy Daily Treatment Note  [facility] Physical Therapy [facility]  [address], [address]  [place] [address]    Patient: Tati Hanley   : 1955  Referring practitioner: Vickey Hair MD  Today's Date: 2023  Patient seen for 25 sessions    Visit Diagnoses:    ICD-10-CM ICD-9-CM   1. Impaired function of upper extremity  R68.89 V49.5   2. S/P arthroscopy of left shoulder  Z98.890 V45.89   3. H/O total shoulder replacement, left  Z96.612 V43.61   4. Acute pain of left shoulder  M25.512 719.41       Subjective   Tati Hanley reports: that she had to cancel her last appt as she had just had oral surgery and was not to increase her HR and BP.  States that she had  reschedule her steroid injection due to the oral surgery.  Will see the oral surgeon next week to see if the procedure took.   States that her right shoulder feels like it is locking and is very painful until it pops back into place.  States that the left shoulder has improved some.  Notes that her pain has decreased in frequency and intensity but still persists.  Still feels weak in end range elevation but is more functional now than it was a few months ago.  Arm fatigues with prolonged activity      Objective   Active flexion 140*   abduction 115*, ER 55*    Tenderness to palpation anterior joint line     See Exercise, Manual, and Modality Logs for complete treatment.     Patient Education: cont HEP, avoid overexertion in elevation as it causes her cervical symptoms to increase    Assessment/Plan  Tati is able to reach into flexion with a much more fluid movement pattern now.  Strength has increased to allow for reaching for light weight items at eye level.  Still with considerable tenderness along the scar.  Cervical symptoms prevent some elevation activities.    Will see her every other week to progress her home activities           Timed:  Manual Therapy:    15     mins  62077;  Therapeutic  Exercise:    15/25     mins  84418;     Neuromuscular Alana:    0    mins  00174;    Therapeutic Activity:     10     mins  51912;     Ultrasound:      0     mins  99954;    Iontophoresis              __0_   mins  Dry Needling               _____   mins        Untimed:  Electrical Stimulation:     0    mins  23423 ( );  Mechanical Traction:             mins  79596;   Paraffin                       _____  mins     Timed Treatment:   40   mins   Total Treatment:     60   mins    Bhavna Davis PT  [place] License # [id number]  Physical Therapist    Electronically signed by Bhavna Davis PT, 09/07/23, 2:07 PM EDT

## 2023-09-14 ENCOUNTER — APPOINTMENT (OUTPATIENT)
Dept: WOMENS IMAGING | Facility: HOSPITAL | Age: 68
End: 2023-09-14
Payer: COMMERCIAL

## 2023-09-14 PROCEDURE — 77067 SCR MAMMO BI INCL CAD: CPT | Performed by: RADIOLOGY

## 2023-09-14 PROCEDURE — 77063 BREAST TOMOSYNTHESIS BI: CPT | Performed by: RADIOLOGY

## 2023-09-15 ENCOUNTER — DOCUMENTATION (OUTPATIENT)
Dept: FAMILY MEDICINE CLINIC | Facility: CLINIC | Age: 68
End: 2023-09-15
Payer: COMMERCIAL

## 2023-09-15 DIAGNOSIS — R92.8 ABNORMAL FINDING ON RADIOLOGICAL EXAMINATION OF BREAST: Primary | ICD-10-CM

## 2023-09-15 NOTE — PROGRESS NOTES
Called patient, verified name and date of birth.  Patient with questions regarding mammogram report, specifically about what the meaning of the asymmetry could be.  Reviewed mammogram report with patient, answered questions.  Patient is currently out of town, will schedule diagnostic mammogram and ultrasound at earliest convenience.  Provided resources for more information via [facility]).    Evelyn Jimenez MD

## 2023-09-26 DIAGNOSIS — R92.8 ABNORMAL FINDING ON RADIOLOGICAL EXAMINATION OF BREAST: Primary | ICD-10-CM

## 2023-10-02 ENCOUNTER — APPOINTMENT (OUTPATIENT)
Dept: WOMENS IMAGING | Facility: HOSPITAL | Age: 68
End: 2023-10-02
Payer: COMMERCIAL

## 2023-10-02 PROCEDURE — 77065 DX MAMMO INCL CAD UNI: CPT | Performed by: RADIOLOGY

## 2023-10-02 PROCEDURE — G0279 TOMOSYNTHESIS, MAMMO: HCPCS | Performed by: RADIOLOGY

## 2023-10-02 PROCEDURE — 76642 ULTRASOUND BREAST LIMITED: CPT | Performed by: RADIOLOGY

## 2023-10-02 PROCEDURE — 77061 BREAST TOMOSYNTHESIS UNI: CPT | Performed by: RADIOLOGY

## 2023-10-03 ENCOUNTER — TELEPHONE (OUTPATIENT)
Dept: FAMILY MEDICINE CLINIC | Facility: CLINIC | Age: 68
End: 2023-10-03
Payer: COMMERCIAL

## 2023-10-03 DIAGNOSIS — R92.8 ABNORMAL FINDING ON RADIOLOGICAL EXAMINATION OF BREAST: Primary | ICD-10-CM

## 2023-10-13 ENCOUNTER — APPOINTMENT (OUTPATIENT)
Dept: WOMENS IMAGING | Facility: HOSPITAL | Age: 68
End: 2023-10-13
Payer: COMMERCIAL

## 2023-10-13 PROCEDURE — 76942 ECHO GUIDE FOR BIOPSY: CPT | Performed by: RADIOLOGY

## 2023-10-13 PROCEDURE — 19001 PUNCTURE ASPIR CYST BRST EA: CPT | Performed by: RADIOLOGY

## 2023-10-13 PROCEDURE — 19000 PUNCTURE ASPIR CYST BREAST: CPT | Performed by: RADIOLOGY

## 2023-10-19 ENCOUNTER — TELEPHONE (OUTPATIENT)
Dept: FAMILY MEDICINE CLINIC | Facility: CLINIC | Age: 68
End: 2023-10-19
Payer: COMMERCIAL

## 2023-10-19 NOTE — TELEPHONE ENCOUNTER
According to the report from 10/13, the radiologist discussed this with the patient at the time of the visit.  With the aspiration, there was complete resolution of the cyst and she can return to regular routine mammogram screening. Was there a question?

## 2023-10-19 NOTE — TELEPHONE ENCOUNTER
Caller: Trae Gilliam    Relationship: Emergency Contact    Best call back number: [phone]    What test was performed: MAMMOGRAM AND BIOPSY     Where was the test performed: [facility]     Additional notes: PATIENTS  IS REQUESTING A CALL BACK WITH TEST RESULTS.

## 2023-10-19 NOTE — TELEPHONE ENCOUNTER
Lvm informing pt's  that I have sent a message to Dr GERBER and just waiting on her dictation. Informed him that she is not currently in office today.

## 2023-10-19 NOTE — TELEPHONE ENCOUNTER
Caller: Trae Gilliam    Relationship: Emergency Contact    Best call back number: [phone]     Who are you requesting to speak with (clinical staff, provider,  specific staff member): CLINICAL    What was the call regarding: PATIENT'S  CALLED TO CHECK ON STATUS OF RESULTS. HE STATES THAT THEY [facility] [address] ON MONDAY, 10/16/23, AND WERE TOLD A BIOPSY REPORT HAD BEEN SENT TO DR PAZ THAT DAY. REQUESTS CALL BACK TO FOLLOW UP

## 2023-11-17 ENCOUNTER — OFFICE VISIT (OUTPATIENT)
Dept: FAMILY MEDICINE CLINIC | Facility: CLINIC | Age: 68
End: 2023-11-17
Payer: COMMERCIAL

## 2023-11-17 VITALS
DIASTOLIC BLOOD PRESSURE: 78 MMHG | WEIGHT: 119 LBS | BODY MASS INDEX: 20.32 KG/M2 | HEIGHT: 64 IN | OXYGEN SATURATION: 98 % | HEART RATE: 67 BPM | SYSTOLIC BLOOD PRESSURE: 120 MMHG

## 2023-11-17 DIAGNOSIS — F41.9 ANXIETY: ICD-10-CM

## 2023-11-17 DIAGNOSIS — M54.2 CERVICAL SPINE PAIN: Primary | ICD-10-CM

## 2023-11-17 DIAGNOSIS — E78.00 HYPERCHOLESTEROLEMIA: ICD-10-CM

## 2023-11-17 PROCEDURE — 99214 OFFICE O/P EST MOD 30 MIN: CPT | Performed by: FAMILY MEDICINE

## 2023-11-17 RX ORDER — BACILLUS COAGULANS/INULIN 1B-250 MG
CAPSULE ORAL
COMMUNITY
Start: 2023-08-01

## 2023-11-17 RX ORDER — HYDROCODONE BITARTRATE AND ACETAMINOPHEN 7.5; 325 MG/1; MG/1
0.5 TABLET ORAL EVERY 6 HOURS PRN
Qty: 50 TABLET | Refills: 0 | Status: SHIPPED | OUTPATIENT
Start: 2023-11-17

## 2023-11-17 RX ORDER — SERTRALINE HYDROCHLORIDE 25 MG/1
25 TABLET, FILM COATED ORAL DAILY
Qty: 90 TABLET | Refills: 1 | Status: SHIPPED | OUTPATIENT
Start: 2023-11-17

## 2023-11-17 NOTE — PATIENT INSTRUCTIONS
You have been given a controlled substance.  These drugs are addicting and must be used with caution.  Use the drug only when needed and avoid operating heavy machinery or driving when using the medication until you know how you respond.  Dispose of the medication properly when you no longer need it.  I need to see you every 90 days, routine drug testing and Keon monitoring will be done as required by [place] law.  If 90 days passes, I can not renew the medication. I recommend you schedule now.      We talked about pain management as an option if needed and we're not quite ready yet.     We talked diet and omega 3's aren't quite what you need.

## 2023-11-17 NOTE — ASSESSMENT & PLAN NOTE
Keon was reviewed and was appropriate.  Tox screen is up-to-date and is also appropriate.  Agreements signed and if you need surgery, we will defer to the surgeon.

## 2023-11-17 NOTE — PROGRESS NOTES
"Subjective     Tati Gilliam is a 68 y.o. female who presents with   Chief Complaint   Patient presents with    Hyperlipidemia       History of Present Illness     Here for follow up of neck pain.  She takes a low dose of hydrocodone as needed and is here for a follow up.  She had prior surgeries and had medications from those surgeries that she has been using and now is ready to resume her prescriptions here.  She's seeing Dr. Jackson and they are considering future surgeries.  She just \"continues to deteriorate\"  Her ability to exercise is limited.     Working on a healthy diet and taking omega 3's but her cholesterol abnormality is all LDL.      She recently had mammogram issues and ended up having cyst aspirations with resolutions of the abnormalities on her mammogram.  She found the process quite frustrating.      She has been on sertraline for quite some time and a very low-dose is started around anxiety with health problems of her daughter.  She does well with this medicine and would like a renewal             Review of Systems     Objective     /78 (BP Location: Left arm, Patient Position: Sitting, Cuff Size: Adult)   Pulse 67   Ht 162.6 cm (64.02\")   Wt 54 kg (119 lb)   SpO2 98%   BMI 20.42 kg/m²     Physical Exam  Constitutional:       Appearance: Normal appearance.   Musculoskeletal:         General: Tenderness (Very tight musculature on the left side of her neck and diminished range of motion) present.   Neurological:      Mental Status: She is alert.   Psychiatric:         Behavior: Behavior normal.         Thought Content: Thought content normal.         Procedures     Assessment & Plan   Diagnoses and all orders for this visit:    1. Cervical spine pain (Primary)  -     HYDROcodone-acetaminophen (Norco) 7.5-325 MG per tablet; Take 0.5 tablets by mouth Every 6 (Six) Hours As Needed for Moderate Pain.  Dispense: 50 tablet; Refill: 0    2. Hypercholesterolemia    3. " Anxiety  Assessment & Plan:  Doing well since her daughter is doing well and helped by sertraline.       Other orders  -     sertraline (ZOLOFT) 25 MG tablet; Take 1 tablet by mouth Daily.  Dispense: 90 tablet; Refill: 1         Discussion    Patient Instructions   You have been given a controlled substance.  These drugs are addicting and must be used with caution.  Use the drug only when needed and avoid operating heavy machinery or driving when using the medication until you know how you respond.  Dispose of the medication properly when you no longer need it.  I need to see you every 90 days, routine drug testing and Keon monitoring will be done as required by [place] law.  If 90 days passes, I can not renew the medication. I recommend you schedule now.      We talked about pain management as an option if needed and we're not quite ready yet.     We talked diet and omega 3's aren't quite what you need.                Eloisa Epperson MD

## 2024-01-19 ENCOUNTER — TELEPHONE (OUTPATIENT)
Dept: FAMILY MEDICINE CLINIC | Facility: CLINIC | Age: 69
End: 2024-01-19

## 2024-01-19 NOTE — TELEPHONE ENCOUNTER
Caller: Tati Biggs    Relationship: Self    Best call back number: [phone]    What is the best time to reach you: ANYTIME     Who are you requesting to speak with (clinical staff, provider,  specific staff member):      What was the call regarding: PATIENT STATES THAT SHE IS HAVING PAIN AND SWELLING IN HER FEET AND IT HAS STARTED CAUSING DIFFICULTY WALKING.     PATIENT WOULD LIKE TO GET IN TO SEE HER PCP AS SOON AS POSSIBLE TO DISCUSS THIS ISSUE AND POSSIBLY GET ON A MEDICATION TO HELP.

## 2024-01-26 ENCOUNTER — OFFICE VISIT (OUTPATIENT)
Dept: FAMILY MEDICINE CLINIC | Facility: CLINIC | Age: 69
End: 2024-01-26

## 2024-01-26 VITALS
TEMPERATURE: 97.6 F | RESPIRATION RATE: 16 BRPM | DIASTOLIC BLOOD PRESSURE: 80 MMHG | HEIGHT: 64 IN | BODY MASS INDEX: 21.13 KG/M2 | SYSTOLIC BLOOD PRESSURE: 123 MMHG | WEIGHT: 123.8 LBS | HEART RATE: 57 BPM | OXYGEN SATURATION: 98 %

## 2024-01-26 DIAGNOSIS — M79.671 BILATERAL FOOT PAIN: ICD-10-CM

## 2024-01-26 DIAGNOSIS — M79.672 BILATERAL FOOT PAIN: ICD-10-CM

## 2024-01-26 DIAGNOSIS — E78.00 PURE HYPERCHOLESTEROLEMIA: ICD-10-CM

## 2024-01-26 DIAGNOSIS — M25.476 SWELLING OF FIRST METATARSOPHALANGEAL (MTP) JOINT: ICD-10-CM

## 2024-01-26 DIAGNOSIS — I75.023 BLUE TOE SYNDROME OF BOTH LOWER EXTREMITIES: Primary | ICD-10-CM

## 2024-01-26 RX ORDER — METHYLPREDNISOLONE 4 MG/1
TABLET ORAL
Qty: 21 TABLET | Refills: 0 | Status: SHIPPED | OUTPATIENT
Start: 2024-01-26

## 2024-01-26 RX ORDER — PROGESTERONE 100 MG/1
1 CAPSULE ORAL DAILY
COMMUNITY
Start: 2024-01-20

## 2024-01-26 NOTE — PATIENT INSTRUCTIONS
We are getting a stat vascular consult.    We're doing a big workup for autoimmune and blood flow issues.  We are even checking for arthritis changes with an x-ray.    We decided to try a steroid pack.

## 2024-01-26 NOTE — PROGRESS NOTES
"Subjective     Tati Gilliam is a 68 y.o. female who presents with   Chief Complaint   Patient presents with    Foot Swelling     Red, swelling, painful        History of Present Illness     She has a couple months of pain in her toes.  It started in both second toes at the same time. The sheets hurt at night.  Hurts with walking but she pushes through.  It burns like when coming in from the cold when growing up in [place].      She is not a smoker.  She does have high cholesterol but no other risk.  She has had a fusion of her right first MCP joint and the left is bothersome right now.  She doesn't have a known history of Raynauds syndrome.               Review of Systems     Objective     /80 (BP Location: Right arm, Patient Position: Sitting, Cuff Size: Adult)   Pulse 57   Temp 97.6 °F (36.4 °C) (Oral)   Resp 16   Ht 162.6 cm (64\")   Wt 56.2 kg (123 lb 12.8 oz)   SpO2 98%   Breastfeeding No   BMI 21.25 kg/m²     Physical Exam  Constitutional:       Appearance: Normal appearance.   Cardiovascular:      Rate and Rhythm: Normal rate and regular rhythm.      Pulses: Normal pulses.      Heart sounds: Normal heart sounds.      Comments: Normal DP and PT pulses  Pulmonary:      Effort: Pulmonary effort is normal.      Breath sounds: Normal breath sounds.   Skin:     Comments: Dark red irregular patches on the bottom of the second toe bilaterally.  Purple hue to toes.  Normal pulses.  Some redness and swelling left first MTP   Neurological:      Mental Status: She is alert.   Psychiatric:         Behavior: Behavior normal.         Thought Content: Thought content normal.         Procedures     Assessment & Plan   Diagnoses and all orders for this visit:    1. 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 Pure hypercholesterolemia    3. Swelling of first metatarsophalangeal (MTP) joint  -     XR Foot 2 View Bilateral; Future    4. Bilateral foot pain  -     XR Foot 2 View Bilateral; Future  -     methylPREDNISolone (MEDROL) 4 MG dose pack; Take as directed on package instructions.  Dispense: 21 tablet; Refill: 0         Discussion    Patient Instructions   We are getting a stat vascular consult.    We're doing a big workup for autoimmune and blood flow issues.  We are even checking for arthritis changes with an x-ray.    We decided to try a steroid pack.               Eloisa Epperson MD

## 2024-02-02 ENCOUNTER — TELEPHONE (OUTPATIENT)
Dept: FAMILY MEDICINE CLINIC | Facility: CLINIC | Age: 69
End: 2024-02-02
Payer: MEDICAID

## 2024-02-13 ENCOUNTER — TELEPHONE (OUTPATIENT)
Dept: FAMILY MEDICINE CLINIC | Facility: CLINIC | Age: 69
End: 2024-02-13
Payer: MEDICAID

## 2024-02-22 ENCOUNTER — TRANSCRIBE ORDERS (OUTPATIENT)
Dept: ADMINISTRATIVE | Facility: HOSPITAL | Age: 69
End: 2024-02-22
Payer: COMMERCIAL

## 2024-02-22 DIAGNOSIS — I73.9 PAD (PERIPHERAL ARTERY DISEASE): Primary | ICD-10-CM

## 2024-03-01 ENCOUNTER — PATIENT MESSAGE (OUTPATIENT)
Dept: FAMILY MEDICINE CLINIC | Facility: CLINIC | Age: 69
End: 2024-03-01
Payer: COMMERCIAL

## 2024-03-05 NOTE — TELEPHONE ENCOUNTER
From: Tati Gilliam  To: Eloisa Epperson  Sent: 3/1/2024 2:49 PM EST  Subject: Feedback    Hi Dr. GERBER,   Trying to help and with great respect; my two cents:    Labs: Elevated levels for Albumin and A/G Ratio, Glucose and Calcium (all climbing for two years). Automimmune tests negative. These tests are notoriously inaccurate (false negatives) and don't do well at diagnosing autoimmune issues, in general. Consider all my joint replacement without elevations.    Peripheral Vascular Disease: Highly unlikely, as per   Dr. Roy, Vascular Surgeon. (Cancel the expensive arterial doppler test on March 8th, with your permission)? Of course, I'll go if you say so.     Foot Images - Probably need left foot fused too, as shifted alignment effects of Hallux Rigidus may be irritating other toes. Dr. Patricio relayed 'severe arthritis' when he assessed and fused right foot (2017).     I am wondering about psoritatic arthritis d/t history of shoulder, foot joint replacement (neck!!!!!) and with symptoms appearing in toe tips. Rheumatologist Referral?    Toe swelling has returned bi-laterally after the Mederol Dose Pack reduced inflammation. Don't think it is infection (fungal/cellulitis) with it appearing on both sides. Hydrogen peroxide gives no reaction. Tried anti-fungal shampoo - no change. Maybe have the wrong OTC medication? Culture? Dermatology?    My humble take for the record. Tati

## 2024-03-08 ENCOUNTER — OFFICE VISIT (OUTPATIENT)
Dept: FAMILY MEDICINE CLINIC | Facility: CLINIC | Age: 69
End: 2024-03-08
Payer: COMMERCIAL

## 2024-03-08 ENCOUNTER — HOSPITAL ENCOUNTER (OUTPATIENT)
Dept: CARDIOLOGY | Facility: HOSPITAL | Age: 69
Discharge: HOME | End: 2024-03-08
Payer: COMMERCIAL

## 2024-03-08 VITALS
DIASTOLIC BLOOD PRESSURE: 84 MMHG | BODY MASS INDEX: 21.19 KG/M2 | HEART RATE: 69 BPM | RESPIRATION RATE: 16 BRPM | OXYGEN SATURATION: 98 % | WEIGHT: 124.1 LBS | HEIGHT: 64 IN | SYSTOLIC BLOOD PRESSURE: 136 MMHG

## 2024-03-08 DIAGNOSIS — M79.672 BILATERAL FOOT PAIN: Primary | ICD-10-CM

## 2024-03-08 DIAGNOSIS — M25.512 CHRONIC PAIN OF BOTH SHOULDERS: ICD-10-CM

## 2024-03-08 DIAGNOSIS — G89.29 CHRONIC PAIN OF BOTH SHOULDERS: ICD-10-CM

## 2024-03-08 DIAGNOSIS — I73.9 PAD (PERIPHERAL ARTERY DISEASE): ICD-10-CM

## 2024-03-08 DIAGNOSIS — M54.2 CERVICAL SPINE PAIN: ICD-10-CM

## 2024-03-08 DIAGNOSIS — M25.511 CHRONIC PAIN OF BOTH SHOULDERS: ICD-10-CM

## 2024-03-08 DIAGNOSIS — L30.9 DERMATITIS: ICD-10-CM

## 2024-03-08 DIAGNOSIS — M79.671 BILATERAL FOOT PAIN: Primary | ICD-10-CM

## 2024-03-08 DIAGNOSIS — F41.9 ANXIETY: ICD-10-CM

## 2024-03-08 PROBLEM — M06.072 RHEUMATOID ARTHRITIS INVOLVING BOTH FEET WITH NEGATIVE RHEUMATOID FACTOR: Status: ACTIVE | Noted: 2024-03-08

## 2024-03-08 PROBLEM — M06.071 RHEUMATOID ARTHRITIS INVOLVING BOTH FEET WITH NEGATIVE RHEUMATOID FACTOR: Status: ACTIVE | Noted: 2024-03-08

## 2024-03-08 PROBLEM — M06.071 RHEUMATOID ARTHRITIS INVOLVING BOTH FEET WITH NEGATIVE RHEUMATOID FACTOR: Status: RESOLVED | Noted: 2024-03-08 | Resolved: 2024-03-08

## 2024-03-08 PROBLEM — M06.072 RHEUMATOID ARTHRITIS INVOLVING BOTH FEET WITH NEGATIVE RHEUMATOID FACTOR: Status: RESOLVED | Noted: 2024-03-08 | Resolved: 2024-03-08

## 2024-03-08 LAB
BH CV LOWER ARTERIAL LEFT 2ND DIGIT SYS MAX: 135
BH CV LOWER ARTERIAL LEFT 3RD DIGIT SYS MAX: 104
BH CV LOWER ARTERIAL LEFT 4TH DIGIT SYS MAX: 134
BH CV LOWER ARTERIAL LEFT 5TH DIGIT SYS MAX: 148
BH CV LOWER ARTERIAL LEFT ABI RATIO: 1.1
BH CV LOWER ARTERIAL LEFT DORSALIS PEDIS SYS MAX: 138
BH CV LOWER ARTERIAL LEFT GREAT TOE SYS MAX: 136
BH CV LOWER ARTERIAL LEFT POST TIBIAL SYS MAX: 147
BH CV LOWER ARTERIAL LEFT TBI RATIO: 1.01
BH CV LOWER ARTERIAL RIGHT 2ND DIGIT SYS MAX: 115
BH CV LOWER ARTERIAL RIGHT 3RD DIGIT SYS MAX: 48
BH CV LOWER ARTERIAL RIGHT 4TH DIGIT SYS MAX: 92
BH CV LOWER ARTERIAL RIGHT 5TH DIGIT SYS MAX: 131
BH CV LOWER ARTERIAL RIGHT ABI RATIO: 1.12
BH CV LOWER ARTERIAL RIGHT DORSALIS PEDIS SYS MAX: 144
BH CV LOWER ARTERIAL RIGHT GREAT TOE SYS MAX: 121
BH CV LOWER ARTERIAL RIGHT POST TIBIAL SYS MAX: 150
BH CV LOWER ARTERIAL RIGHT TBI RATIO: 0.9
UPPER ARTERIAL LEFT ARM BRACHIAL SYS MAX: 134
UPPER ARTERIAL RIGHT ARM BRACHIAL SYS MAX: 122

## 2024-03-08 PROCEDURE — 99214 OFFICE O/P EST MOD 30 MIN: CPT | Performed by: FAMILY MEDICINE

## 2024-03-08 PROCEDURE — 93924 LWR XTR VASC STDY BILAT: CPT

## 2024-03-08 RX ORDER — SERTRALINE HYDROCHLORIDE 25 MG/1
25 TABLET, FILM COATED ORAL DAILY
Qty: 90 TABLET | Refills: 1 | Status: SHIPPED | OUTPATIENT
Start: 2024-03-08

## 2024-03-08 RX ORDER — HYDROCODONE BITARTRATE AND ACETAMINOPHEN 7.5; 325 MG/1; MG/1
0.5 TABLET ORAL EVERY 6 HOURS PRN
Qty: 50 TABLET | Refills: 0 | Status: SHIPPED | OUTPATIENT
Start: 2024-03-08

## 2024-03-08 RX ORDER — TIZANIDINE 2 MG/1
2 TABLET ORAL EVERY 8 HOURS PRN
Qty: 60 TABLET | Refills: 1 | Status: SHIPPED | OUTPATIENT
Start: 2024-03-08

## 2024-03-08 NOTE — PROGRESS NOTES
"Subjective     Tati Gilliam is a 68 y.o. female who presents with   Chief Complaint   Patient presents with    Back Pain     Hydrocodone refill        Back Pain         She has completed all of the testing for her toes and she just had her pressures checked.  She had arthritis in the feet and may need a fusion in her foot.  She had labs done and the only issues were albumin.  She is concerned that it is psoriatic arthritis because of her arthritis issues in her neck, shoulders and hips and feet.  She's recently started getting rashes.  The medrol dose pack that I gave her did get the swelling down in her toes but it's coming back.     She has chronic pain in both shoulders and the left is starting to get batter.  She's having pain in her hips and feet.  She has chornic neck pain also.  She uses small doses of hydrocodone and Advil and that helps.  She's due for a renewal of hydrocodone.  She breaks the pills and uses about 50 in a quarter.    She's starting to have panic attacks despite sertraline.  She's able to work through them but it has been a long time since she's had them. She stimulates her PNS by unfocusing her eyes and looking to the right with right eye and left with the left eye.  If she can get past 15 minutes she's fine.              Review of Systems   Musculoskeletal:  Positive for back pain.        Objective     /84 (BP Location: Left arm, Patient Position: Sitting, Cuff Size: Adult)   Pulse 69   Resp 16   Ht 162.6 cm (64\")   Wt 56.3 kg (124 lb 1.6 oz)   SpO2 98%   Breastfeeding No   BMI 21.30 kg/m²     Physical Exam  Constitutional:       Appearance: Normal appearance.   Musculoskeletal:         General: Swelling (distant toes) present.   Skin:     Findings: Rash (anterior surface of lower legs) present.      Comments: Some purple discoloration of toes   Neurological:      Mental Status: She is alert.   Psychiatric:         Behavior: Behavior normal.         Thought Content: " Thought content normal.         Procedures     Assessment & Plan   Diagnoses and all orders for this visit:    1. Bilateral foot pain (Primary)  -     Ambulatory Referral to Rheumatology    2. Chronic pain of both shoulders  Assessment & Plan:  The left has been replaced and is finally getting better.     Orders:  -     Ambulatory Referral to Rheumatology    3. Dermatitis  -     Ambulatory Referral to Rheumatology  -     Ambulatory Referral to Dermatology    4. Cervical spine pain  -     HYDROcodone-acetaminophen (Norco) 7.5-325 MG per tablet; Take 0.5 tablets by mouth Every 6 (Six) Hours As Needed for Moderate Pain.  Dispense: 50 tablet; Refill: 0  -     tiZANidine (ZANAFLEX) 2 MG tablet; Take 1 tablet by mouth Every 8 (Eight) Hours As Needed for Muscle Spasms.  Dispense: 60 tablet; Refill: 1    5. Anxiety  Assessment & Plan:  Currently on low dose Zoloft and doing self help.     Orders:  -     sertraline (ZOLOFT) 25 MG tablet; Take 1 tablet by mouth Daily.  Dispense: 90 tablet; Refill: 1         Discussion    Patient Instructions   You have been given a controlled substance.  These drugs are addicting and must be used with caution.  Use the drug only when needed and avoid operating heavy machinery or driving when using the medication until you know how you respond.  Dispose of the medication properly when you no longer need it.  I need to see you every 90 days, routine drug testing and Keon monitoring will be done as required by [place] law.  If 90 days passes, I can not renew the medication. I recommend you schedule now.      We are getting a rheumatology and dermatology consult  to look into psoriatic arthritis.               Eloisa Epperson MD

## 2024-03-08 NOTE — PATIENT INSTRUCTIONS
You have been given a controlled substance.  These drugs are addicting and must be used with caution.  Use the drug only when needed and avoid operating heavy machinery or driving when using the medication until you know how you respond.  Dispose of the medication properly when you no longer need it.  I need to see you every 90 days, routine drug testing and Keon monitoring will be done as required by [place] law.  If 90 days passes, I can not renew the medication. I recommend you schedule now.      We are getting a rheumatology and dermatology consult  to look into psoriatic arthritis.

## 2024-03-19 ENCOUNTER — PATIENT MESSAGE (OUTPATIENT)
Dept: FAMILY MEDICINE CLINIC | Facility: CLINIC | Age: 69
End: 2024-03-19
Payer: COMMERCIAL

## 2024-03-19 NOTE — TELEPHONE ENCOUNTER
From: Tati Gilliam  To: Eloisa Epperson  Sent: 3/19/2024 9:41 AM EDT  Subject: Dulce Maria from Tati    Dear Dr. GERBER,   Please know that on Friday, March 22nd, I am scheduled for dental implant surgery and anticipate that Dr. Nash will prescribe a short course of pain medication to get me through the weekend, as per his staff. How do you wish for me to procede?    Thank you for your time, Tati

## 2024-05-17 ENCOUNTER — OFFICE VISIT (OUTPATIENT)
Dept: FAMILY MEDICINE CLINIC | Facility: CLINIC | Age: 69
End: 2024-05-17
Payer: COMMERCIAL

## 2024-05-17 VITALS
SYSTOLIC BLOOD PRESSURE: 116 MMHG | OXYGEN SATURATION: 98 % | HEIGHT: 64 IN | DIASTOLIC BLOOD PRESSURE: 62 MMHG | BODY MASS INDEX: 19.81 KG/M2 | WEIGHT: 116 LBS | HEART RATE: 66 BPM

## 2024-05-17 DIAGNOSIS — I75.023 BLUE TOE SYNDROME OF BOTH LOWER EXTREMITIES: ICD-10-CM

## 2024-05-17 DIAGNOSIS — B02.21 RAMSAY HUNT AURICULAR SYNDROME: ICD-10-CM

## 2024-05-17 DIAGNOSIS — F41.9 ANXIETY: ICD-10-CM

## 2024-05-17 DIAGNOSIS — E55.9 VITAMIN D DEFICIENCY: ICD-10-CM

## 2024-05-17 DIAGNOSIS — M54.2 CERVICAL SPINE PAIN: Primary | ICD-10-CM

## 2024-05-17 PROCEDURE — 99214 OFFICE O/P EST MOD 30 MIN: CPT | Performed by: FAMILY MEDICINE

## 2024-05-17 RX ORDER — CLOBETASOL PROPIONATE 0.5 MG/G
CREAM TOPICAL
COMMUNITY
Start: 2024-04-18

## 2024-05-17 RX ORDER — HYDROCODONE BITARTRATE AND ACETAMINOPHEN 7.5; 325 MG/1; MG/1
0.5 TABLET ORAL EVERY 6 HOURS PRN
Qty: 50 TABLET | Refills: 0 | Status: SHIPPED | OUTPATIENT
Start: 2024-05-17

## 2024-05-17 RX ORDER — PROGESTERONE 100 MG/1
1 CAPSULE ORAL DAILY
COMMUNITY
Start: 2024-04-19 | End: 2024-05-17

## 2024-05-17 NOTE — PROGRESS NOTES
"Subjective     Tati Gilliam is a 68 y.o. female who presents with   Chief Complaint   Patient presents with    Anxiety       Anxiety    She is currently on Zoloft for management of anxiety and is doing well on a very low dose.    Regarding her blue toes, she saw dermatology, rheumatology ( thought maybe chillblains) and vascular surgery. The consensus seems to point to psoriatic arthritis and she was given Otezla for it but it's too costly.  She's going to work on making herself healthier through diet.     She went to the dentist and she's having a lot of issues.  She is getting an implant and did get a script for oxycodone but it's too strong for her.  It is on her Keon but she did not take it.    Dr. Clarke also found a significant vitamin D deficiency, glucose intolerance on labs and possible Celiac oh her visit. She's eating low carb and gluten free.  She's taking vitamin D and probiotics.  Staying out of the heat.  She does not want to do additional testing at this point.    Chronic neck pain and arthritis and takes a half of hydrocodone with one Advil once every evening.  She also uses a low-dose of tizanidine on a regular basis.  She has had shoulder replacement and shoulder pain as well.               Review of Systems     Objective     /62   Pulse 66   Ht 162.6 cm (64.02\")   Wt 52.6 kg (116 lb)   SpO2 98%   BMI 19.90 kg/m²     Physical Exam  Constitutional:       Appearance: Normal appearance.   Musculoskeletal:         General: Tenderness present.      Comments: And tightness in scalenes and trapezium   Neurological:      Mental Status: She is alert.   Psychiatric:         Behavior: Behavior normal.         Thought Content: Thought content normal.         Procedures     Assessment & Plan   Diagnoses and all orders for this visit:    1. Cervical spine pain (Primary)  -     HYDROcodone-acetaminophen (Norco) 7.5-325 MG per tablet; Take 0.5 tablets by mouth Every 6 (Six) Hours As Needed " for Moderate Pain.  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 Blue toe syndrome of both lower extremities  Comments:  Possibly psoriatic arthritis.    4. Anxiety  Assessment & Plan:  Stable on a low-dose of Zoloft.      5. Vitamin D deficiency  Assessment & Plan:  Newly identified and on supplements           Discussion    Patient Instructions   I think dietary modification has a role for the inflammation you are dealing with, we can always do the trial on otezla if you're not improving.     We talked about TDaP (tetanus) Prevnar 20, RSV (Arexvy), COVID and shingles vaccines and you're going to look at that regarding [place]-Bonds.     You have been given a controlled substance.  These drugs are addicting and must be used with caution.  Use the drug only when needed and avoid operating heavy machinery or driving when using the medication until you know how you respond.  Dispose of the medication properly when you no longer need it.  I need to see you every 90 days, routine drug testing and Keon monitoring will be done as required by [place] law.  If 90 days passes, I can not renew the medication. I recommend you schedule now.                Eloisa Epperson MD           Answers submitted by the patient for this visit:  Other (Submitted on 5/10/2024)  Please describe your symptoms.: Recheck and update on swelling toes from three months ago after several external consults., , Refills on tizanidine, sertraline and Norco.  Have you had these symptoms before?: Yes  How long have you been having these symptoms?: Greater than 2 weeks  Please list any medications you are currently taking for this condition.: Clobetasol, Vitamin D and K drops, Collagen supplements for 13% drop in bone density.  Please describe any probable cause for these symptoms. : Autoimmune issues in including Spongiotic Dermatitis, ruling out psoriatic  arthritis, Vitamin D deficiency and celiac disease and bone loss via osteopenia.  Primary Reason for Visit (Submitted on 5/10/2024)  What is the primary reason for your visit?: Other

## 2024-05-17 NOTE — PATIENT INSTRUCTIONS
I think dietary modification has a role for the inflammation you are dealing with, we can always do the trial on otezla if you're not improving.     We talked about TDaP (tetanus) Prevnar 20, RSV (Arexvy), COVID and shingles vaccines and you're going to look at that regarding Dilshad.     You have been given a controlled substance.  These drugs are addicting and must be used with caution.  Use the drug only when needed and avoid operating heavy machinery or driving when using the medication until you know how you respond.  Dispose of the medication properly when you no longer need it.  I need to see you every 90 days, routine drug testing and Keon monitoring will be done as required by [place] law.  If 90 days passes, I can not renew the medication. I recommend you schedule now.

## (undated) DEVICE — BLD SHAVER BONECUTTER 4MM 13CM

## (undated) DEVICE — DRSNG GZ PETROLTM XEROFORM CURAD 1X8IN STRL

## (undated) DEVICE — DRAPE,SHOULDER,BEACH ULTRAGARD: Brand: MEDLINE

## (undated) DEVICE — PK ARTHSCP SHLDR TOWER 40

## (undated) DEVICE — ABL ASP APOLLO RF XL 90D

## (undated) DEVICE — SKIN PREP TRAY W/CHG: Brand: MEDLINE INDUSTRIES, INC.

## (undated) DEVICE — SUT ETHLN 3/0 PS1 18IN 1663H

## (undated) DEVICE — GLV SURG BIOGEL LTX PF 8

## (undated) DEVICE — GLV SURG BIOGEL LTX PF 8 1/2